# Patient Record
Sex: MALE | Race: WHITE | Employment: UNEMPLOYED | URBAN - METROPOLITAN AREA
[De-identification: names, ages, dates, MRNs, and addresses within clinical notes are randomized per-mention and may not be internally consistent; named-entity substitution may affect disease eponyms.]

---

## 2017-02-16 ENCOUNTER — HOSPITAL ENCOUNTER (OUTPATIENT)
Dept: LAB | Age: 47
Discharge: HOME | End: 2017-02-16
Payer: MEDICARE

## 2017-02-16 LAB
ALBUMIN SERPL BCP-MCNC: 3.8 G/DL (ref 3.4–5)
ALBUMIN/GLOB SERPL: 1.1 {RATIO} (ref 0.8–1.7)
ALP SERPL-CCNC: 58 U/L (ref 45–117)
ALT SERPL-CCNC: 46 U/L (ref 16–61)
ANION GAP BLD CALC-SCNC: 8 MMOL/L (ref 3–18)
AST SERPL W P-5'-P-CCNC: 32 U/L (ref 15–37)
BILIRUB SERPL-MCNC: 0.8 MG/DL (ref 0.2–1)
BUN SERPL-MCNC: 9 MG/DL (ref 7–18)
BUN/CREAT SERPL: 8 (ref 12–20)
CALCIUM SERPL-MCNC: 9.1 MG/DL (ref 8.5–10.1)
CHLORIDE SERPL-SCNC: 104 MMOL/L (ref 100–108)
CHOLEST SERPL-MCNC: 175 MG/DL
CO2 SERPL-SCNC: 29 MMOL/L (ref 21–32)
CREAT SERPL-MCNC: 1.16 MG/DL (ref 0.6–1.3)
GLOBULIN SER CALC-MCNC: 3.4 G/DL (ref 2–4)
GLUCOSE SERPL-MCNC: 110 MG/DL (ref 74–99)
HBA1C MFR BLD: 5.5 % (ref 4.2–5.6)
HDLC SERPL-MCNC: 33 MG/DL (ref 40–60)
HDLC SERPL: 5.3 {RATIO} (ref 0–5)
LDLC SERPL CALC-MCNC: 107 MG/DL (ref 0–100)
LIPID PROFILE,FLP: ABNORMAL
POTASSIUM SERPL-SCNC: 3.9 MMOL/L (ref 3.5–5.5)
PROT SERPL-MCNC: 7.2 G/DL (ref 6.4–8.2)
SODIUM SERPL-SCNC: 141 MMOL/L (ref 136–145)
TRIGL SERPL-MCNC: 175 MG/DL (ref ?–150)
VLDLC SERPL CALC-MCNC: 35 MG/DL

## 2017-02-16 PROCEDURE — 83036 HEMOGLOBIN GLYCOSYLATED A1C: CPT | Performed by: FAMILY MEDICINE

## 2017-02-16 PROCEDURE — 80053 COMPREHEN METABOLIC PANEL: CPT | Performed by: FAMILY MEDICINE

## 2017-02-16 PROCEDURE — 80061 LIPID PANEL: CPT | Performed by: FAMILY MEDICINE

## 2017-02-16 PROCEDURE — 36415 COLL VENOUS BLD VENIPUNCTURE: CPT | Performed by: FAMILY MEDICINE

## 2017-02-27 ENCOUNTER — OFFICE VISIT (OUTPATIENT)
Dept: FAMILY MEDICINE CLINIC | Age: 47
End: 2017-02-27

## 2017-02-27 VITALS
SYSTOLIC BLOOD PRESSURE: 120 MMHG | DIASTOLIC BLOOD PRESSURE: 78 MMHG | HEIGHT: 73 IN | BODY MASS INDEX: 31.81 KG/M2 | WEIGHT: 240 LBS | HEART RATE: 97 BPM | TEMPERATURE: 97.7 F | RESPIRATION RATE: 20 BRPM

## 2017-02-27 DIAGNOSIS — E78.1 HYPERTRIGLYCERIDEMIA: ICD-10-CM

## 2017-02-27 DIAGNOSIS — E78.2 MIXED HYPERLIPIDEMIA: Primary | ICD-10-CM

## 2017-02-27 DIAGNOSIS — F63.9 IMPULSE CONTROL DISORDER: ICD-10-CM

## 2017-02-27 DIAGNOSIS — F32.89 OTHER DEPRESSION: ICD-10-CM

## 2017-02-27 DIAGNOSIS — R73.01 ELEVATED FASTING BLOOD SUGAR: ICD-10-CM

## 2017-02-27 RX ORDER — TRAZODONE HYDROCHLORIDE 100 MG/1
TABLET ORAL
COMMUNITY
Start: 2017-02-10

## 2017-02-27 NOTE — PROGRESS NOTES
Chief Complaint   Patient presents with    Anxiety    Depression    Allergic Rhinitis    Results     discuss lab results       Health Maintenance reviewed     1. Have you been to the ER, urgent care clinic since your last visit? Hospitalized since your last visit? No    2. Have you seen or consulted any other health care providers outside of the [address] [place] [address] since your last visit? Include any pap smears or colon screening.  No

## 2017-02-27 NOTE — MR AVS SNAPSHOT
Visit Information Date & Time Provider Department Dept. Phone Encounter #  
 2/27/2017  2:30 PM Red Albarran, [address] [phone] Follow-up Instructions Return in about 3 months (around 5/27/2017). Upcoming Health Maintenance Date Due DTaP/Tdap/Td series (1 - Tdap) 1/10/1990 INFLUENZA AGE 9 TO ADULT 8/1/2016 Allergies as of 2/27/2017  Review Complete On: 2/27/2017 By: Red Albarran MD  
 No Known Allergies Current Immunizations  Reviewed on 6/7/2016 No immunizations on file. Not reviewed this visit You Were Diagnosed With   
  
 Codes Comments Mixed hyperlipidemia    -  Primary ICD-10-CM: P60.5 ICD-9-CM: 272.2 Hypertriglyceridemia     ICD-10-CM: E78.1 ICD-9-CM: 272.1 Elevated fasting blood sugar     ICD-10-CM: R73.01 
ICD-9-CM: 790.21 Impulse control disorder     ICD-10-CM: F63.9 ICD-9-CM: 312.30 Other depression     ICD-10-CM: F32.89 Vitals BP  
  
  
  
  
  
 142/89 (BP 1 Location: Right arm, BP Patient Position: Sitting) BMI and BSA Data Body Mass Index Body Surface Area  
 31.66 kg/m 2 2.37 m 2 Preferred Pharmacy Pharmacy Name Phone [facility] [address] [place], [address] Your Updated Medication List  
  
   
This list is accurate as of: 2/27/17  3:01 PM.  Always use your most recent med list.  
  
  
  
  
 CLARITIN 10 mg tablet Generic drug:  loratadine Take 10 mg by mouth as needed. clonazePAM 1 mg tablet Commonly known as:  KlonoPIN  
TAKE ONE-HALF TO ONE TABLET BY MOUTH TWICE DAILY AS NEEDED  
  
 gabapentin 300 mg capsule Commonly known as:  NEURONTIN  
TAKE ONE CAPSULE BY MOUTH THREE TIMES DAILY  
  
 sertraline 100 mg tablet Commonly known as:  ZOLOFT  
TAKE TWO TABLETS BY MOUTH ONCE DAILY  
  
 travoprost 0.004 % ophthalmic solution Commonly known as:  TRAVATAN Z  
 Administer 1 Drop to both eyes every evening. traZODone 100 mg tablet Commonly known as:  Edward Corona Follow-up Instructions Return in about 3 months (around 5/27/2017). Patient Instructions Please contact our office if you have any questions about your visit today. Introducing [facility] SERVICES! Jayesh Rios introduces [employer] patient portal. Now you can access parts of your medical record, email your doctor's office, and request medication refills online. 1. In your internet browser, go to [email or web address] 2. Click on the First Time User? Click Here link in the Sign In box. You will see the New Member Sign Up page. 3. Enter your [employer] Access Code exactly as it appears below. You will not need to use this code after youve completed the sign-up process. If you do not sign up before the expiration date, you must request a new code. · [employer] Access Code: [email or web address] Expires: 5/28/2017  2:25 PM 
 
4. Enter the last four digits of your Social Security Number (xxxx) and Date of Birth (mm/dd/yyyy) as indicated and click Submit. You will be taken to the next sign-up page. 5. Create a [employer] ID. This will be your [employer] login ID and cannot be changed, so think of one that is secure and easy to remember. 6. Create a [employer] password. You can change your password at any time. 7. Enter your Password Reset Question and Answer. This can be used at a later time if you forget your password. 8. Enter your e-mail address. You will receive e-mail notification when new information is available in [address] 9. Click Sign Up. You can now view and download portions of your medical record. 10. Click the Download Summary menu link to download a portable copy of your medical information. If you have questions, please visit the Frequently Asked Questions section of the [employer] website.  Remember, [employer] is NOT to be used for urgent needs. For medical emergencies, dial 911. Now available from your iPhone and Android! Please provide this summary of care documentation to your next provider. Your primary care clinician is listed as TORI HARDING. If you have any questions after today's visit, please call [phone].

## 2017-02-27 NOTE — PROGRESS NOTES
HISTORY OF PRESENT ILLNESS  Alon Moody is a 49 y.o. male. Chief Complaint   Patient presents with    Anxiety chronic problem, stable Reports compliance with meds no recent outbursts    Depression chronic problem, stable Reports compliance with meds     Allergic Rhinitis    Results     discuss lab results   follow up elev fbg prediabetes chronic problem, stable no meds on diet for this   follow up hyperlipidemia no meds Chronic problem, uncontrolled increased not as good with diet    HPI  Past Medical History:   Diagnosis Date    Anxiety     Depression     Elevated BP     Glaucoma     Hypokalemia     Hyponatraemia     Impulse control disorder     Other specific developmental learning difficulties     Seizure [facility])     age 6/ mid age 10-17.  Speech impediment      Current Outpatient Prescriptions   Medication Sig Dispense Refill    traZODone (DESYREL) 100 mg tablet       gabapentin (NEURONTIN) 300 mg capsule TAKE ONE CAPSULE BY MOUTH THREE TIMES DAILY 90 Cap 0    sertraline (ZOLOFT) 100 mg tablet TAKE TWO TABLETS BY MOUTH ONCE DAILY (Patient taking differently: Take 150 mg by mouth daily.) 60 Tab 3    travoprost (TRAVATAN) 0.004 % ophthalmic solution Administer 1 Drop to both eyes every evening.  loratadine (CLARITIN) 10 mg tablet Take 10 mg by mouth as needed.  clonazePAM (KLONOPIN) 1 mg tablet TAKE ONE-HALF TO ONE TABLET BY MOUTH TWICE DAILY AS NEEDED 60 Tab 2     No Known Allergies    ROS Respiratory: Negative for shortness of breath. Cardiovascular: Negative for chest pain. Genitourinary: Negative for frequency. Neurological: Negative for dizziness and headaches. Visit Vitals    /89 (BP 1 Location: Right arm, BP Patient Position: Sitting)    Pulse 97    Temp 97.7 °F (36.5 °C) (Oral)    Resp 20    Ht 6' 1\" (1.854 m)    Wt 240 lb (108.9 kg)    BMI 31.66 kg/m2       Physical Exam Nursing note and vitals reviewed.   Constitutional: He is oriented to person, place, and time. He appears well-developed and well-nourished. No distress. HENT:   Mouth/Throat: Oropharynx is clear and moist.   Neck: No JVD present. No thyromegaly present. Cardiovascular: Normal rate, regular rhythm and normal heart sounds. Pulmonary/Chest: Effort normal and breath sounds normal. No respiratory distress. He has no wheezes. He has no rales. Abdominal: Soft. Bowel sounds are normal. He exhibits no distension. There is no tenderness. There is no rebound. Musculoskeletal: He exhibits no edema and no tenderness. Lymphadenopathy:     He has no cervical adenopathy. Neurological: He is alert and oriented to person, place, and time. Skin: No pallor. Psychiatric: He has a normal mood and affect. His behavior is normal.    Results for orders placed or performed during the hospital encounter of 02/16/17   LIPID PANEL   Result Value Ref Range    LIPID PROFILE          Cholesterol, total 175 <200 MG/DL    Triglyceride 175 (H) <150 MG/DL    HDL Cholesterol 33 (L) 40 - 60 MG/DL    LDL, calculated 107 (H) 0 - 100 MG/DL    VLDL, calculated 35 MG/DL    CHOL/HDL Ratio 5.3 (H) 0 - 5.0     METABOLIC PANEL, COMPREHENSIVE   Result Value Ref Range    Sodium 141 136 - 145 mmol/L    Potassium 3.9 3.5 - 5.5 mmol/L    Chloride 104 100 - 108 mmol/L    CO2 29 21 - 32 mmol/L    Anion gap 8 3.0 - 18 mmol/L    Glucose 110 (H) 74 - 99 mg/dL    BUN 9 7.0 - 18 MG/DL    Creatinine 1.16 0.6 - 1.3 MG/DL    BUN/Creatinine ratio 8 (L) 12 - 20      GFR est AA >60 >60 ml/min/1.73m2    GFR est non-AA >60 >60 ml/min/1.73m2    Calcium 9.1 8.5 - 10.1 MG/DL    Bilirubin, total 0.8 0.2 - 1.0 MG/DL    ALT (SGPT) 46 16 - 61 U/L    AST (SGOT) 32 15 - 37 U/L    Alk.  phosphatase 58 45 - 117 U/L    Protein, total 7.2 6.4 - 8.2 g/dL    Albumin 3.8 3.4 - 5.0 g/dL    Globulin 3.4 2.0 - 4.0 g/dL    A-G Ratio 1.1 0.8 - 1.7     HEMOGLOBIN A1C W/O EAG   Result Value Ref Range    Hemoglobin A1c 5.5 4.2 - 5.6 %       ASSESSMENT and PLAN ICD-10-CM ICD-9-CM    1. Mixed hyperlipidemia uncontrolled increased some work on diet E78.2 272.2    2. Hypertriglyceridemia same E78.1 272.1    3. Elevated fasting blood sugar improved dm risk, monitor R73.01 790.21    4. Impulse control disorder stable continue current medications  F63.9 312.30    5. Other depression stable continue current medications  F32.89     Follow-up Disposition:  Return in about 3 months (around 5/27/2017).

## 2017-06-08 ENCOUNTER — OFFICE VISIT (OUTPATIENT)
Dept: FAMILY MEDICINE CLINIC | Age: 47
End: 2017-06-08

## 2017-06-08 VITALS
DIASTOLIC BLOOD PRESSURE: 84 MMHG | HEART RATE: 99 BPM | OXYGEN SATURATION: 97 % | HEIGHT: 73 IN | SYSTOLIC BLOOD PRESSURE: 145 MMHG | BODY MASS INDEX: 33.13 KG/M2 | WEIGHT: 250 LBS | TEMPERATURE: 99.2 F

## 2017-06-08 DIAGNOSIS — Z12.11 SCREEN FOR COLON CANCER: ICD-10-CM

## 2017-06-08 DIAGNOSIS — Z00.00 ROUTINE GENERAL MEDICAL EXAMINATION AT A HEALTH CARE FACILITY: Primary | ICD-10-CM

## 2017-06-08 DIAGNOSIS — R45.4 IRRITABILITY AND ANGER: ICD-10-CM

## 2017-06-08 DIAGNOSIS — Z13.31 SCREENING FOR DEPRESSION: ICD-10-CM

## 2017-06-08 DIAGNOSIS — F63.9 IMPULSE CONTROL DISORDER: ICD-10-CM

## 2017-06-08 DIAGNOSIS — Z12.5 SPECIAL SCREENING FOR MALIGNANT NEOPLASM OF PROSTATE: ICD-10-CM

## 2017-06-08 DIAGNOSIS — Z71.89 ACP (ADVANCE CARE PLANNING): ICD-10-CM

## 2017-06-08 DIAGNOSIS — H40.10X1 OPEN-ANGLE GLAUCOMA OF BOTH EYES, MILD STAGE, UNSPECIFIED OPEN-ANGLE GLAUCOMA TYPE: ICD-10-CM

## 2017-06-08 DIAGNOSIS — Z71.89 ADVANCED DIRECTIVES, COUNSELING/DISCUSSION: ICD-10-CM

## 2017-06-08 DIAGNOSIS — Z13.39 SCREENING FOR ALCOHOLISM: ICD-10-CM

## 2017-06-08 NOTE — PATIENT INSTRUCTIONS
Please contact our office if you have any questions about your visit today. Well Visit, Ages 25 to 48: Care Instructions  Your Care Instructions  Physical exams can help you stay healthy. Your doctor has checked your overall health and may have suggested ways to take good care of yourself. He or she also may have recommended tests. At home, you can help prevent illness with healthy eating, regular exercise, and other steps. Follow-up care is a key part of your treatment and safety. Be sure to make and go to all appointments, and call your doctor if you are having problems. It's also a good idea to know your test results and keep a list of the medicines you take. How can you care for yourself at home? · Reach and stay at a healthy weight. This will lower your risk for many problems, such as obesity, diabetes, heart disease, and high blood pressure. · Get at least 30 minutes of physical activity on most days of the week. Walking is a good choice. You also may want to do other activities, such as running, swimming, cycling, or playing tennis or team sports. Discuss any changes in your exercise program with your doctor. · Do not smoke or allow others to smoke around you. If you need help quitting, talk to your doctor about stop-smoking programs and medicines. These can increase your chances of quitting for good. · Talk to your doctor about whether you have any risk factors for sexually transmitted infections (STIs). Having one sex partner (who does not have STIs and does not have sex with anyone else) is a good way to avoid these infections. · Use birth control if you do not want to have children at this time. Talk with your doctor about the choices available and what might be best for you. · Protect your skin from too much sun. When you're outdoors from 10 a.m. to 4 p.m., stay in the shade or cover up with clothing and a hat with a wide brim. Wear sunglasses that block UV rays.  Even when it's cloudy, put broad-spectrum sunscreen (SPF 30 or higher) on any exposed skin. · See a dentist one or two times a year for checkups and to have your teeth cleaned. · Wear a seat belt in the car. · Drink alcohol in moderation, if at all. That means no more than 2 drinks a day for men and 1 drink a day for women. Follow your doctor's advice about when to have certain tests. These tests can spot problems early. For everyone  · Cholesterol. Have the fat (cholesterol) in your blood tested after age 21. Your doctor will tell you how often to have this done based on your age, family history, or other things that can increase your risk for heart disease. · Blood pressure. Have your blood pressure checked during a routine doctor visit. Your doctor will tell you how often to check your blood pressure based on your age, your blood pressure results, and other factors. · Vision. Talk with your doctor about how often to have a glaucoma test.  · Diabetes. Ask your doctor whether you should have tests for diabetes. · Colon cancer. Have a test for colon cancer at age 48. You may have one of several tests. If you are younger than 48, you may need a test earlier if you have any risk factors. Risk factors include whether you already had a precancerous polyp removed from your colon or whether your parent, brother, sister, or child has had colon cancer. For women  · Breast exam and mammogram. Talk to your doctor about when you should have a clinical breast exam and a mammogram. Medical experts differ on whether and how often women under 50 should have these tests. Your doctor can help you decide what is right for you. · Pap test and pelvic exam. Begin Pap tests at age 24. A Pap test is the best way to find cervical cancer. The test often is part of a pelvic exam. Ask how often to have this test.  · Tests for sexually transmitted infections (STIs). Ask whether you should have tests for STIs.  You may be at risk if you have sex with more than one person, especially if your partners do not wear condoms. For men  · Tests for sexually transmitted infections (STIs). Ask whether you should have tests for STIs. You may be at risk if you have sex with more than one person, especially if you do not wear a condom. · Testicular cancer exam. Ask your doctor whether you should check your testicles regularly. · Prostate exam. Talk to your doctor about whether you should have a blood test (called a PSA test) for prostate cancer. Experts differ on whether and when men should have this test. Some experts suggest it if you are older than 39 and are -American or have a father or brother who got prostate cancer when he was younger than 72. When should you call for help? Watch closely for changes in your health, and be sure to contact your doctor if you have any problems or symptoms that concern you. Where can you learn more? Go to http://kittyHDFfavian.info/. Enter P072 in the search box to learn more about \"Well Visit, Ages 25 to 48: Care Instructions. \"  Current as of: July 19, 2016  Content Version: 11.2  © 9779-5587 [employer]. Care instructions adapted under license by [employer] (which disclaims liability or warranty for this information). If you have questions about a medical condition or this instruction, always ask your healthcare professional. [place] [address] any warranty or liability for your use of this information. Medicare Part B Preventive Services Limitations Recommendation Scheduled   Bone Mass Measurement  (age 72 & older, biennial) Requires diagnosis related to osteoporosis or estrogen deficiency.  Biennial benefit unless patient has history of long-term glucocorticoid tx or baseline is needed because initial test was by other method     Cardiovascular Screening Blood Tests (every 5 years)  Total cholesterol, HDL, Triglycerides Order as a panel if possible     Colorectal Cancer Screening  -Fecal occult blood test (annual)  -Flexible sigmoidoscopy (5y)  -Screening colonoscopy (10y)  -Barium Enema      Counseling to Prevent Tobacco Use (up to 8 sessions per year)  - Counseling greater than 3 and up to 10 minutes  - Counseling greater than 10 minutes Patients must be asymptomatic of tobacco-related conditions to receive as preventive service     Diabetes Screening Tests (at least every 3 years, Medicare covers annually or at 6-month intervals for prediabetic patients)    Fasting blood sugar (FBS) or glucose tolerance test (GTT) Patient must be diagnosed with one of the following:  -Hypertension, Dyslipidemia, obesity, previous impaired FBS or GTT  Or any two of the following: overweight, FH of diabetes, age ? 72, history of gestational diabetes, birth of baby weighing more than 9 pounds     Diabetes Self-Management Training (DSMT) (no USPSTF recommendation) Requires referral by treating physician for patient with diabetes or renal disease. 10 hours of initial DSMT session of no less than 30 minutes each in a continuous 12-month period. 2 hours of follow-up DSMT in subsequent years. Glaucoma Screening (no USPSTF recommendation) Diabetes mellitus, family history, , age 48 or over,  American, age 72 or over     Human Immunodeficiency Virus (HIV) Screening (annually for increased risk patients)  HIV-1 and HIV-2 by EIA, TODD, rapid antibody test, or oral mucosa transudate Patient must be at increased risk for HIV infection per USPSTF guidelines or pregnant. Tests covered annually for patients at increased risk. Pregnant patients may receive up to 3 test during pregnancy. Medical Nutrition Therapy (MNT) (for diabetes or renal disease not recommended schedule) Requires referral by treating physician for patient with diabetes or renal disease.   Can be provided in same year as diabetes self-management training (DSMT), and CMS recommends medical nutrition therapy take place after DSMT. Up to 3 hours for initial year and 2 hours in subsequent years. Prostate Cancer Screening (annually up to age 76)  - Digital rectal exam (SONALI)  - Prostate specific antigen (PSA) Annually (age 48 or over), SONALI not paid separately when covered E/M service is provided on same date  Men up to age 76 may need a screening blood test for prostate cancer at certain intervals, depending on their personal and family history. This decision is between the patient and his provider. Seasonal Influenza Vaccination (annually)        Pneumococcal Vaccination (once after 72)      Hepatitis B Vaccinations (if medium/high risk) Medium/high risk factors:  End-stage renal disease,  Hemophiliacs who received Factor VIII or IX concentrates, Clients of institutions for the mentally retarded, Persons who live in the same house as a HepB virus carrier, Homosexual men, Illicit injectable drug abusers. Shingles Vaccination A shingles vaccine is also recommended once in a lifetime after age 61     Ultrasound Screening for Abdominal Aortic Aneurysm (AAA) (once) Patient must be referred through [facility] and not have had a screening for abdominal aortic aneurysm before under Medicare.   Limited to patients who meet one of the following criteria:  - Men who are 73-68 years old and have smoked more than 100 cigarettes in their lifetime.  -Anyone with a FH of AAA  -Anyone recommended for screening by USPSTF

## 2017-06-08 NOTE — ACP (ADVANCE CARE PLANNING)
Advance Care Planning      Advance Care Planning    Advance Care Planning (ACP) Provider Note - Comprehensive     Date of ACP Conversation: 06/08/17  Persons included in Conversation:  patient  Length of ACP Conversation in minutes:  16 minutes    Authorized Decision Maker (if patient is incapable of making informed decisions): This person is:  none          General ACP for ALL Patients with Decision Making Capacity:   Importance of advance care planning, including choosing a healthcare agent to communicate patient's healthcare decisions if patient lost the ability to make decisions, such as after a sudden illness or accident  Understanding of the healthcare agent role was assessed and information provided  Exploration of values, goals, and preferences if recovery is not expected, even with continued medical treatment in the event of: Imminent death  Severe, permanent brain injury  Opportunity offered to explore how cultural, Samaritan, spiritual, or personal beliefs would affect decisions for future care     Review of Existing Advance Directive:  none    For Serious or Chronic Illness:  No known illness    Interventions Provided:  Recommended completion of Advance Directive form after review of ACP materials and conversation with prospective healthcare agent   Recommended communicating the plan and making copies for the healthcare agent, personal physician, and others as appropriate (e.g., health system)  Recommended review of completed ACP document annually or upon change in health status Discussed in detail [facility] with patient.  Patient seems to have decided on no heroic measures if terminally ill, but wants care if deemed appropriate, at the present time and handouts given for pt complete disposition

## 2017-06-08 NOTE — PROGRESS NOTES
Chief Complaint   Patient presents with   [address] [place] [address] Annual Wellness Visit     1. Have you been to the ER, urgent care clinic since your last visit? Hospitalized since your last visit? No    2. Have you seen or consulted any other health care providers outside of the [address] [place] [address] since your last visit? Include any pap smears or colon screening. No    This is a Subsequent Medicare Annual Wellness Visit providing Personalized Prevention Plan Services (PPPS) (Performed 12 months after initial AWV and PPPS )    I have reviewed the patient's medical history in detail and updated the computerized patient record. History     Past Medical History:   Diagnosis Date    Anxiety     Depression     Elevated BP     Glaucoma     Hypokalemia     Hyponatraemia     Impulse control disorder     Other specific developmental learning difficulties     Seizure [facility])     age 6/ mid age 10-17.  Speech impediment       No past surgical history on file. Current Outpatient Prescriptions   Medication Sig Dispense Refill    traZODone (DESYREL) 100 mg tablet       gabapentin (NEURONTIN) 300 mg capsule TAKE ONE CAPSULE BY MOUTH THREE TIMES DAILY 90 Cap 0    clonazePAM (KLONOPIN) 1 mg tablet TAKE ONE-HALF TO ONE TABLET BY MOUTH TWICE DAILY AS NEEDED 60 Tab 2    sertraline (ZOLOFT) 100 mg tablet TAKE TWO TABLETS BY MOUTH ONCE DAILY (Patient taking differently: Take 150 mg by mouth daily.) 60 Tab 3    travoprost (TRAVATAN) 0.004 % ophthalmic solution Administer 1 Drop to both eyes every evening.  loratadine (CLARITIN) 10 mg tablet Take 10 mg by mouth as needed. No Known Allergies  No family history on file.   Social History   Substance Use Topics    Smoking status: Never Smoker    Smokeless tobacco: Never Used    Alcohol use No     Patient Active Problem List   Diagnosis Code    Impulse control disorder F63.9    Depression F32.9    Anxiety F41.9    Insomnia G47.00    Irritability and anger R45.4    AR (allergic rhinitis) J30.9    Glaucoma, open angle, Dr. Stefani Camilo H40.10X0    Cataract H26.9    Atypical chest pain R07.89    Prediabetes R73.03    Hypertriglyceridemia E78.1    ACP (advance care planning) Z71.89       Depression Risk Factor Screening:     PHQ over the last two weeks 6/7/2016   PHQ Not Done Active Diagnosis of Depression or Bipolar Disorder   Little interest or pleasure in doing things Not at all   Feeling down, depressed or hopeless Not at all   Total Score PHQ 2 0     Alcohol Risk Factor Screening: On any occasion during the past 3 months, have you had more than 4 drinks containing alcohol? No    Do you average more than 14 drinks per week? No      Functional Ability and Level of Safety:   No exam data present    Hearing Loss   none    Activities of Daily Living   Self-care. Requires assistance with: no ADLs    Fall Risk   No flowsheet data found. Abuse Screen   Patient is not abused    Review of Systems   Pertinent items are noted in HPI. Physical Examination     Evaluation of Cognitive Function:  Mood/affect:  neutral  Appearance: age appropriate and within normal Limits  Family member/caregiver input: father states pt doing well    Nursing note and vitals reviewed. Constitutional: He is oriented to person, place, and time. He appears well-developed and well-nourished. No distress. HENT:   Mouth/Throat: Oropharynx is clear and moist.   Neck: No JVD present. No thyromegaly present. Cardiovascular: Normal rate, regular rhythm and normal heart sounds. Pulmonary/Chest: Effort normal and breath sounds normal. No respiratory distress. He has no wheezes. He has no rales. Abdominal: Soft. Bowel sounds are normal. He exhibits no distension. There is no tenderness. There is no rebound. Musculoskeletal: He exhibits no edema and no tenderness. Lymphadenopathy:     He has no cervical adenopathy. Neurological: He is alert and oriented to person, place, and time.    Skin: No pallor. Psychiatric: He has a normal mood and affect. His behavior is normal.       Patient Care Team:  Talita Cortez MD as PCP - General    Advice/Referrals/Counseling   Education and counseling provided:  Are appropriate based on today's review and evaluation  End-of-Life planning (with patient's consent)  Pneumococcal Vaccine  Influenza Vaccine  Prostate cancer screening tests (PSA, covered annually)  Colorectal cancer screening tests      Assessment/Plan       ICD-10-CM ICD-9-CM    1. Routine general medical examination at a health care facility Z00.00 V70.0    2. Screening for alcoholism Z13.89 V79.1    3. Advanced directives, counseling/discussion Z71.89 V65.49    4. Screening for depression Z13.89 V79.0 DEPRESSION SCREEN ANNUAL   5. Screen for colon cancer Z12.11 V76.51 OCCULT BLOOD, IMMUNOASSAY (FIT)   6. Special screening for malignant neoplasm of prostate Z12.5 V76.44 PSA SCREENING (SCREENING)   7.  ACP (advance care planning) Z71.89 V65.49 ADVANCE CARE PLANNING FIRST 30 MINS

## 2017-06-08 NOTE — MR AVS SNAPSHOT
Visit Information Date & Time Provider Department Dept. Phone Encounter #  
 6/8/2017  2:30 PM Germain Khalil 69 [phone] [id number] Your Appointments 6/12/2017  2:30 PM  
Follow Up with MD Germain Khalil 69 (--) Appt Note: 3 month fu; [address] [id number] [phone]  
  
   
 Jen [address]ann[address] [place] [id number] Upcoming Health Maintenance Date Due DTaP/Tdap/Td series (1 - Tdap) 1/10/1990 INFLUENZA AGE 9 TO ADULT 8/1/2017 Allergies as of 6/8/2017  Review Complete On: 6/8/2017 By: King Olga LPN No Known Allergies Current Immunizations  Reviewed on 6/8/2017 No immunizations on file. Reviewed by Estrella Del Rosario MD on 6/8/2017 at  3:08 PM  
You Were Diagnosed With   
  
 Codes Comments Routine general medical examination at a health care facility    -  Primary ICD-10-CM: Z00.00 ICD-9-CM: V70.0 Screening for alcoholism     ICD-10-CM: Z13.89 ICD-9-CM: V79.1 Advanced directives, counseling/discussion     ICD-10-CM: Z71.89 ICD-9-CM: V65.49 Screening for depression     ICD-10-CM: Z13.89 ICD-9-CM: V79.0 Screen for colon cancer     ICD-10-CM: Z12.11 ICD-9-CM: V76.51 Special screening for malignant neoplasm of prostate     ICD-10-CM: Z12.5 ICD-9-CM: V76.44   
 ACP (advance care planning)     ICD-10-CM: Z71.89 ICD-9-CM: V65.49 Impulse control disorder     ICD-10-CM: F63.9 ICD-9-CM: 312.30 Irritability and anger     ICD-10-CM: R45.4 ICD-9-CM: 799.22 Open-angle glaucoma of both eyes, mild stage, unspecified open-angle glaucoma type     ICD-10-CM: H40.10X1 ICD-9-CM: 365.10, 365.71 Vitals BP Pulse Temp Height(growth percentile) Weight(growth percentile) SpO2  
 145/84 99 99.2 °F (37.3 °C) (Oral) 6' 1\" (1.854 m) 250 lb (113.4 kg) 97% BMI Smoking Status 32.98 kg/m2 Never Smoker BMI and BSA Data Body Mass Index Body Surface Area 32.98 kg/m 2 2.42 m 2 Preferred Pharmacy Pharmacy Name Phone [facility] [address] [place], [address] Your Updated Medication List  
  
   
This list is accurate as of: 6/8/17  3:17 PM.  Always use your most recent med list.  
  
  
  
  
 CLARITIN 10 mg tablet Generic drug:  loratadine Take 10 mg by mouth as needed. clonazePAM 1 mg tablet Commonly known as:  KlonoPIN  
TAKE ONE-HALF TO ONE TABLET BY MOUTH TWICE DAILY AS NEEDED  
  
 gabapentin 300 mg capsule Commonly known as:  NEURONTIN  
TAKE ONE CAPSULE BY MOUTH THREE TIMES DAILY  
  
 sertraline 100 mg tablet Commonly known as:  ZOLOFT  
TAKE TWO TABLETS BY MOUTH ONCE DAILY  
  
 travoprost 0.004 % ophthalmic solution Commonly known as:  TRAVATAN Z Administer 1 Drop to both eyes every evening. traZODone 100 mg tablet Commonly known as:  Sherice Lugo We Performed the Following ADVANCE CARE PLANNING FIRST 30 MINS [42522 CPT(R)] [facility] [[id number] [facility]] To-Do List   
 06/08/2017 Lab:  OCCULT BLOOD, IMMUNOASSAY (FIT)   
  
 06/08/2017 Lab:  PSA SCREENING (SCREENING) Patient Instructions Please contact our office if you have any questions about your visit today. Well Visit, Ages 25 to 48: Care Instructions Your Care Instructions Physical exams can help you stay healthy. Your doctor has checked your overall health and may have suggested ways to take good care of yourself. He or she also may have recommended tests. At home, you can help prevent illness with healthy eating, regular exercise, and other steps. Follow-up care is a key part of your treatment and safety. Be sure to make and go to all appointments, and call your doctor if you are having problems. It's also a good idea to know your test results and keep a list of the medicines you take. How can you care for yourself at home? · Reach and stay at a healthy weight. This will lower your risk for many problems, such as obesity, diabetes, heart disease, and high blood pressure. · Get at least 30 minutes of physical activity on most days of the week. Walking is a good choice. You also may want to do other activities, such as running, swimming, cycling, or playing tennis or team sports. Discuss any changes in your exercise program with your doctor. · Do not smoke or allow others to smoke around you. If you need help quitting, talk to your doctor about stop-smoking programs and medicines. These can increase your chances of quitting for good. · Talk to your doctor about whether you have any risk factors for sexually transmitted infections (STIs). Having one sex partner (who does not have STIs and does not have sex with anyone else) is a good way to avoid these infections. · Use birth control if you do not want to have children at this time. Talk with your doctor about the choices available and what might be best for you. · Protect your skin from too much sun. When you're outdoors from 10 a.m. to 4 p.m., stay in the shade or cover up with clothing and a hat with a wide brim. Wear sunglasses that block UV rays. Even when it's cloudy, put broad-spectrum sunscreen (SPF 30 or higher) on any exposed skin. · See a dentist one or two times a year for checkups and to have your teeth cleaned. · Wear a seat belt in the car. · Drink alcohol in moderation, if at all. That means no more than 2 drinks a day for men and 1 drink a day for women. Follow your doctor's advice about when to have certain tests. These tests can spot problems early. For everyone · Cholesterol. Have the fat (cholesterol) in your blood tested after age 21. Your doctor will tell you how often to have this done based on your age, family history, or other things that can increase your risk for heart disease. · Blood pressure. Have your blood pressure checked during a routine doctor visit. Your doctor will tell you how often to check your blood pressure based on your age, your blood pressure results, and other factors. · Vision. Talk with your doctor about how often to have a glaucoma test. 
· Diabetes. Ask your doctor whether you should have tests for diabetes. · Colon cancer. Have a test for colon cancer at age 48. You may have one of several tests. If you are younger than 48, you may need a test earlier if you have any risk factors. Risk factors include whether you already had a precancerous polyp removed from your colon or whether your parent, brother, sister, or child has had colon cancer. For women · Breast exam and mammogram. Talk to your doctor about when you should have a clinical breast exam and a mammogram. Medical experts differ on whether and how often women under 50 should have these tests. Your doctor can help you decide what is right for you. · Pap test and pelvic exam. Begin Pap tests at age 24. A Pap test is the best way to find cervical cancer. The test often is part of a pelvic exam. Ask how often to have this test. 
· Tests for sexually transmitted infections (STIs). Ask whether you should have tests for STIs. You may be at risk if you have sex with more than one person, especially if your partners do not wear condoms. For men · Tests for sexually transmitted infections (STIs). Ask whether you should have tests for STIs. You may be at risk if you have sex with more than one person, especially if you do not wear a condom. · Testicular cancer exam. Ask your doctor whether you should check your testicles regularly. · Prostate exam. Talk to your doctor about whether you should have a blood test (called a PSA test) for prostate cancer.  Experts differ on whether and when men should have this test. Some experts suggest it if you are older than 39 and are -American or have a father or brother who got prostate cancer when he was younger than 72. When should you call for help? Watch closely for changes in your health, and be sure to contact your doctor if you have any problems or symptoms that concern you. Where can you learn more? Go to http://kitty-favian.info/. Enter P072 in the search box to learn more about \"Well Visit, Ages 25 to 48: Care Instructions. \" Current as of: July 19, 2016 Content Version: 11.2 © 4406-9125 [employer]. Care instructions adapted under license by [employer] (which disclaims liability or warranty for this information). If you have questions about a medical condition or this instruction, always ask your healthcare professional. [address] any warranty or liability for your use of this information. Medicare Part B Preventive Services Limitations Recommendation Scheduled Bone Mass Measurement 
(age 72 & older, biennial) Requires diagnosis related to osteoporosis or estrogen deficiency. Biennial benefit unless patient has history of long-term glucocorticoid tx or baseline is needed because initial test was by other method Cardiovascular Screening Blood Tests (every 5 years) Total cholesterol, HDL, Triglycerides Order as a panel if possible Colorectal Cancer Screening 
-Fecal occult blood test (annual) -Flexible sigmoidoscopy (5y) 
-Screening colonoscopy (10y) -Barium Enema Counseling to Prevent Tobacco Use (up to 8 sessions per year) - Counseling greater than 3 and up to 10 minutes - Counseling greater than 10 minutes Patients must be asymptomatic of tobacco-related conditions to receive as preventive service Diabetes Screening Tests (at least every 3 years, Medicare covers annually or at 6-month intervals for prediabetic patients) Fasting blood sugar (FBS) or glucose tolerance test (GTT) Patient must be diagnosed with one of the following: 
-Hypertension, Dyslipidemia, obesity, previous impaired FBS or GTT 
Or any two of the following: overweight, FH of diabetes, age ? 72, history of gestational diabetes, birth of baby weighing more than 9 pounds Diabetes Self-Management Training (DSMT) (no USPSTF recommendation) Requires referral by treating physician for patient with diabetes or renal disease. 10 hours of initial DSMT session of no less than 30 minutes each in a continuous 12-month period. 2 hours of follow-up DSMT in subsequent years. Glaucoma Screening (no USPSTF recommendation) Diabetes mellitus, family history, , age 48 or over,  American, age 72 or over Human Immunodeficiency Virus (HIV) Screening (annually for increased risk patients) HIV-1 and HIV-2 by EIA, TODD, rapid antibody test, or oral mucosa transudate Patient must be at increased risk for HIV infection per USPSTF guidelines or pregnant. Tests covered annually for patients at increased risk. Pregnant patients may receive up to 3 test during pregnancy. Medical Nutrition Therapy (MNT) (for diabetes or renal disease not recommended schedule) Requires referral by treating physician for patient with diabetes or renal disease. Can be provided in same year as diabetes self-management training (DSMT), and CMS recommends medical nutrition therapy take place after DSMT. Up to 3 hours for initial year and 2 hours in subsequent years. Prostate Cancer Screening (annually up to age 76) - Digital rectal exam (SONALI) - Prostate specific antigen (PSA) Annually (age 48 or over), SONALI not paid separately when covered E/M service is provided on same date Men up to age 76 may need a screening blood test for prostate cancer at certain intervals, depending on their personal and family history. This decision is between the patient and his provider. Seasonal Influenza Vaccination (annually) Pneumococcal Vaccination (once after 65) Hepatitis B Vaccinations (if medium/high risk) Medium/high risk factors:  End-stage renal disease, Hemophiliacs who received Factor VIII or IX concentrates, Clients of institutions for the mentally retarded, Persons who live in the same house as a HepB virus carrier, Homosexual men, Illicit injectable drug abusers. Shingles Vaccination A shingles vaccine is also recommended once in a lifetime after age 61 Ultrasound Screening for Abdominal Aortic Aneurysm (AAA) (once) Patient must be referred through IPPE and not have had a screening for abdominal aortic aneurysm before under Medicare. Limited to patients who meet one of the following criteria: 
- Men who are 73-68 years old and have smoked more than 100 cigarettes in their lifetime. 
-Anyone with a FH of AAA 
-Anyone recommended for screening by [facility] Introducing [facility]! Isaac Everett introduces [employer] patient portal. Now you can access parts of your medical record, email your doctor's office, and request medication refills online. 1. In your internet browser, go to [email or web address] 2. Click on the First Time User? Click Here link in the Sign In box. You will see the New Member Sign Up page. 3. Enter your [employer] Access Code exactly as it appears below. You will not need to use this code after youve completed the sign-up process. If you do not sign up before the expiration date, you must request a new code. · [employer] Access Code: [email or web address] Expires: 9/6/2017  2:34 PM 
 
4. Enter the last four digits of your Social Security Number (xxxx) and Date of Birth (mm/dd/yyyy) as indicated and click Submit. You will be taken to the next sign-up page. 5. Create a Skyeng ID. This will be your Sky[employer] login ID and cannot be changed, so think of one that is secure and easy to remember. 6. Create a Hot Mix Mobilet password. You can change your password at any time. 7. Enter your Password Reset Question and Answer. This can be used at a later time if you forget your password. 8. Enter your e-mail address. You will receive e-mail notification when new information is available in [address] 9. Click Sign Up. You can now view and download portions of your medical record. 10. Click the Download Summary menu link to download a portable copy of your medical information. If you have questions, please visit the Frequently Asked Questions section of the [employer] website. Remember, [employer] is NOT to be used for urgent needs. For medical emergencies, dial 911. Now available from your iPhone and Android! Please provide this summary of care documentation to your next provider. Your primary care clinician is listed as TORI HARDING. If you have any questions after today's visit, please call [phone].

## 2017-06-08 NOTE — PROGRESS NOTES
HISTORY OF PRESENT ILLNESS  Fabian Goins is a 49 y.o. male. follow-up depression irritability and anger chronic problem stable at present time. Patient's father states that he is doing fairly well on his medications. He reports compliance with medications. Klonopin is helpful he did have his eye exam found to have glaucoma  This is a recurrent problem no new symptoms  HPI  Past Medical History:   Diagnosis Date    Anxiety     Depression     Elevated BP     Glaucoma     Hypokalemia     Hyponatraemia     Impulse control disorder     Other specific developmental learning difficulties     Seizure [facility])     age 6/ mid age 10-17.  Speech impediment      Current Outpatient Prescriptions   Medication Sig Dispense Refill    traZODone (DESYREL) 100 mg tablet       gabapentin (NEURONTIN) 300 mg capsule TAKE ONE CAPSULE BY MOUTH THREE TIMES DAILY 90 Cap 0    clonazePAM (KLONOPIN) 1 mg tablet TAKE ONE-HALF TO ONE TABLET BY MOUTH TWICE DAILY AS NEEDED 60 Tab 2    sertraline (ZOLOFT) 100 mg tablet TAKE TWO TABLETS BY MOUTH ONCE DAILY (Patient taking differently: Take 150 mg by mouth daily.) 60 Tab 3    travoprost (TRAVATAN) 0.004 % ophthalmic solution Administer 1 Drop to both eyes every evening.  loratadine (CLARITIN) 10 mg tablet Take 10 mg by mouth as needed. No Known Allergies    Review of Systems   Psychiatric/Behavioral: Positive for depression. Negative for suicidal ideas. The patient is nervous/anxious and has insomnia. Visit Vitals    /84    Pulse 99    Temp 99.2 °F (37.3 °C) (Oral)    Ht 6' 1\" (1.854 m)    Wt 250 lb (113.4 kg)    SpO2 97%    BMI 32.98 kg/m2       Physical Exam  Nursing note and vitals reviewed. Constitutional: He is oriented to person, place, and time. He appears well-developed and well-nourished. No distress. HENT:   Mouth/Throat: Oropharynx is clear and moist.   Neck: No JVD present. No thyromegaly present.    Cardiovascular: Normal rate, regular rhythm and normal heart sounds. Pulmonary/Chest: Effort normal and breath sounds normal. No respiratory distress. He has no wheezes. He has no rales. Abdominal: Soft. Bowel sounds are normal. He exhibits no distension. There is no tenderness. There is no rebound. Musculoskeletal: He exhibits no edema and no tenderness. Lymphadenopathy:     He has no cervical adenopathy. Neurological: He is alert and oriented to person, place, and time. Skin: No pallor. Psychiatric: He has a normal mood and affect. His behavior is normal.     ASSESSMENT and PLAN    ICD-10-CM ICD-9-CM                                                     8. Impulse control disorder stable continue current care   F63.9 312.30    9. Irritability and anger stable continue current care   R45.4 799.22    10.  Open-angle glaucoma of both eyes, mild stage, unspecified open-angle glaucoma type stable continue current care and follow-up with ophthalmology   H40.10X1 365.10      365.71

## 2017-06-09 LAB — PSA SERPL-MCNC: 0.7 NG/ML (ref 0–4)

## 2017-07-11 LAB
HEMOCCULT STL QL IA: NORMAL
PSA SERPL-MCNC: NORMAL NG/ML

## 2017-10-19 ENCOUNTER — OFFICE VISIT (OUTPATIENT)
Dept: FAMILY MEDICINE CLINIC | Age: 47
End: 2017-10-19

## 2017-10-19 VITALS
SYSTOLIC BLOOD PRESSURE: 135 MMHG | HEART RATE: 92 BPM | TEMPERATURE: 98.6 F | RESPIRATION RATE: 20 BRPM | WEIGHT: 252 LBS | DIASTOLIC BLOOD PRESSURE: 85 MMHG | BODY MASS INDEX: 33.4 KG/M2 | HEIGHT: 73 IN

## 2017-10-19 DIAGNOSIS — R45.4 IRRITABILITY AND ANGER: ICD-10-CM

## 2017-10-19 DIAGNOSIS — E78.1 HYPERTRIGLYCERIDEMIA: ICD-10-CM

## 2017-10-19 DIAGNOSIS — F41.9 ANXIETY: ICD-10-CM

## 2017-10-19 DIAGNOSIS — F63.9 IMPULSE CONTROL DISORDER: Primary | ICD-10-CM

## 2017-10-19 DIAGNOSIS — Z51.81 MEDICATION MONITORING ENCOUNTER: ICD-10-CM

## 2017-10-19 DIAGNOSIS — F32.89 OTHER DEPRESSION: ICD-10-CM

## 2017-10-19 DIAGNOSIS — R73.9 HYPERGLYCEMIA: ICD-10-CM

## 2017-10-19 NOTE — LETTER
10/19/2017 11:43 AM 
 
Mr. Leyda Haas [address] [address] Please see below for attached physical report for Mr. Jsoeph Frances, DOS 6/2017: This is a Subsequent Medicare Annual Wellness Visit providing Personalized Prevention Plan Services (PPPS) (Performed 12 months after initial AWV and PPPS ) 
  
I have reviewed the patient's medical history in detail and updated the computerized patient record.  
  
History  
  
    
Past Medical History:  
Diagnosis Date  Anxiety    
 Depression    
 Elevated BP    
 Glaucoma    
 Hypokalemia    
 Hyponatraemia    
 Impulse control disorder    
 Other specific developmental learning difficulties    
 Seizure [facility])    
  age 6/ mid age 10-17.  Speech impediment    
                       
No past surgical history on file. Current Outpatient Prescriptions Medication Sig Dispense Refill  traZODone (DESYREL) 100 mg tablet        
 gabapentin (NEURONTIN) 300 mg capsule TAKE ONE CAPSULE BY MOUTH THREE TIMES DAILY 90 Cap 0  clonazePAM (KLONOPIN) 1 mg tablet TAKE ONE-HALF TO ONE TABLET BY MOUTH TWICE DAILY AS NEEDED 60 Tab 2  
 sertraline (ZOLOFT) 100 mg tablet TAKE TWO TABLETS BY MOUTH ONCE DAILY (Patient taking differently: Take 150 mg by mouth daily.) 60 Tab 3  
 travoprost (TRAVATAN) 0.004 % ophthalmic solution Administer 1 Drop to both eyes every evening.      
 loratadine (CLARITIN) 10 mg tablet Take 10 mg by mouth as needed.      
  
No Known Allergies No family history on file. Social History Substance Use Topics  Smoking status: Never Smoker  Smokeless tobacco: Never Used  Alcohol use No  
  
    
Patient Active Problem List  
Diagnosis Code  Impulse control disorder F63.9  Depression F32.9  Anxiety F41.9  Insomnia G47.00  
 Irritability and anger R45.4  AR (allergic rhinitis) J30.9  Glaucoma, open angle, Dr. Lyle Husbands H40.10X0  Cataract H26.9  Atypical chest pain R07.89  
 Prediabetes R73.03  
 Hypertriglyceridemia E78.1  ACP (advance care planning) Z71.89  
  
  
Depression Risk Factor Screening:  
  
PHQ over the last two weeks 6/7/2016 PHQ Not Done Active Diagnosis of Depression or Bipolar Disorder Little interest or pleasure in doing things Not at all Feeling down, depressed or hopeless Not at all Total Score PHQ 2 0  
  
Alcohol Risk Factor Screening: On any occasion during the past 3 months, have you had more than 4 drinks containing alcohol? No 
  
Do you average more than 14 drinks per week? No 
  
  
Functional Ability and Level of Safety: No exam data present 
  
Hearing Loss  
none 
  
Activities of Daily Living Self-care. Requires assistance with: no ADLs 
  
Fall Risk No flowsheet data found. Abuse Screen Patient is not abused 
  
Review of Systems Pertinent items are noted in HPI. 
  
Physical Examination  
  
Evaluation of Cognitive Function: 
Mood/affect:  neutral 
Appearance: age appropriate and within normal Limits Family member/caregiver input: father states pt doing well 
  
Nursing note and vitals reviewed. Constitutional: He is oriented to person, place, and time. He appears well-developed and well-nourished. No distress. HENT:  
Mouth/Throat: Oropharynx is clear and moist.  
Neck: No JVD present. No thyromegaly present. Cardiovascular: Normal rate, regular rhythm and normal heart sounds. Pulmonary/Chest: Effort normal and breath sounds normal. No respiratory distress. He has no wheezes. He has no rales. Abdominal: Soft. Bowel sounds are normal. He exhibits no distension. There is no tenderness. There is no rebound. Musculoskeletal: He exhibits no edema and no tenderness. Lymphadenopathy:  
  He has no cervical adenopathy. Neurological: He is alert and oriented to person, place, and time. Skin: No pallor. Psychiatric: He has a normal mood and affect.  His behavior is normal.  
  : NMEG, testicles descended bilaterally, no hernia 
  
Patient Care Team: 
Royce Camacho MD as PCP - General 
  
Advice/Referrals/Counseling Education and counseling provided: 
Are appropriate based on today's review and evaluation End-of-Life planning (with patient's consent) Pneumococcal Vaccine Influenza Vaccine Prostate cancer screening tests (PSA, covered annually) Colorectal cancer screening tests 
  
  
Assessment/Plan  
  
    ICD-10-CM ICD-9-CM    
1. Routine general medical examination at a health care facility Z00.00 V70.0    
2. Screening for alcoholism Z13.89 V79.1    
3. Advanced directives, counseling/discussion Z71.89 V65.49    
4. Screening for depression Z13.89 V79.0 [place] [address] 5. Screen for colon cancer Z12.11 V76.51 OCCULT BLOOD, IMMUNOASSAY (FIT) 6. Special screening for malignant neoplasm of prostate Z12.5 V76.44 PSA SCREENING (SCREENING) 7.  ACP (advance care planning) Z71.89 V65.49 ADVANCE CARE PLANNING FIRST 27 MINS  
  
 
 
 
 
Sincerely, 
 
 
Royce Camacho MD

## 2017-10-19 NOTE — MR AVS SNAPSHOT
Visit Information Date & Time Provider Department Dept. Phone Encounter #  
 10/19/2017 10:30 AM Claudio Calixto MD [address] [id number] Follow-up Instructions Return in about 3 months (around 1/19/2018). Upcoming Health Maintenance Date Due DTaP/Tdap/Td series (1 - Tdap) 1/10/1990 INFLUENZA AGE 9 TO ADULT 8/1/2017 Allergies as of 10/19/2017  Review Complete On: 10/19/2017 By: Claudio Calixto MD  
 No Known Allergies Current Immunizations  Reviewed on 6/8/2017 No immunizations on file. Not reviewed this visit You Were Diagnosed With   
  
 Codes Comments Impulse control disorder    -  Primary ICD-10-CM: F63.9 ICD-9-CM: 312.30 Irritability and anger     ICD-10-CM: R45.4 ICD-9-CM: 799.22 Anxiety     ICD-10-CM: F41.9 ICD-9-CM: 300.00 Other depression     ICD-10-CM: F32.89 ICD-9-CM: 550 Hypertriglyceridemia     ICD-10-CM: E78.1 ICD-9-CM: 272.1 Hyperglycemia     ICD-10-CM: R73.9 ICD-9-CM: 790.29 Medication monitoring encounter     ICD-10-CM: Z51.81 
ICD-9-CM: V58.83 Vitals BP Pulse Temp Resp Height(growth percentile) Weight(growth percentile) 135/85 (BP 1 Location: Right arm, BP Patient Position: Sitting) 92 98.6 °F (37 °C) (Oral) 20 6' 1\" (1.854 m) 252 lb (114.3 kg) BMI Smoking Status 33.25 kg/m2 Never Smoker BMI and BSA Data Body Mass Index Body Surface Area  
 33.25 kg/m 2 2.43 m 2 Preferred Pharmacy Pharmacy Name Phone [facility] [address] [place] [address] 32 Your Updated Medication List  
  
   
This list is accurate as of: 10/19/17 11:48 AM.  Always use your most recent med list.  
  
  
  
  
 CLARITIN 10 mg tablet Generic drug:  loratadine Take 10 mg by mouth as needed. clonazePAM 1 mg tablet Commonly known as:  Chau Saldana  
 TAKE ONE-HALF TO ONE TABLET BY MOUTH TWICE DAILY AS NEEDED  
  
 gabapentin 300 mg capsule Commonly known as:  NEURONTIN  
TAKE ONE CAPSULE BY MOUTH THREE TIMES DAILY  
  
 sertraline 100 mg tablet Commonly known as:  ZOLOFT  
TAKE TWO TABLETS BY MOUTH ONCE DAILY  
  
 travoprost 0.004 % ophthalmic solution Commonly known as:  TRAVATAN Z Administer 1 Drop to both eyes every evening. traZODone 100 mg tablet Commonly known as:  Rikki Hernandez Follow-up Instructions Return in about 3 months (around 1/19/2018). To-Do List   
 01/19/2018 Lab:  CBC WITH AUTOMATED DIFF   
  
 01/19/2018 Lab:  HEMOGLOBIN A1C W/O EAG   
  
 01/19/2018 Lab:  LIPID PANEL   
  
 01/19/2018 Lab:  MAGNESIUM   
  
 01/19/2018 Lab:  METABOLIC PANEL, COMPREHENSIVE   
  
 01/19/2018 Lab:  TSH 3RD GENERATION   
  
 01/19/2018 Lab:  VITAMIN B12 Patient Instructions Please contact our office if you have any questions about your visit today. Introducing [facility]! [facility] introduces [facility] patient portal. Now you can access parts of your medical record, email your doctor's office, and request medication refills online. 1. In your internet browser, go to [email or web address] 2. Click on the First Time User? Click Here link in the Sign In box. You will see the New Member Sign Up page. 3. Enter your [employer] Access Code exactly as it appears below. You will not need to use this code after youve completed the sign-up process. If you do not sign up before the expiration date, you must request a new code. · [employer] Access Code: [email or web address] Expires: 1/17/2018 10:24 AM 
 
4. Enter the last four digits of your Social Security Number (xxxx) and Date of Birth (mm/dd/yyyy) as indicated and click Submit. You will be taken to the next sign-up page. 5. Create a [facility] ID.  This will be your [employer] login ID and cannot be changed, so think of one that is secure and easy to remember. 6. Create a [employer]9 password. You can change your password at any time. 7. Enter your Password Reset Question and Answer. This can be used at a later time if you forget your password. 8. Enter your e-mail address. You will receive e-mail notification when new information is available in [address] 9. Click Sign Up. You can now view and download portions of your medical record. 10. Click the Download Summary menu link to download a portable copy of your medical information. If you have questions, please visit the Frequently Asked Questions section of the [employer] website. Remember, [employer] is NOT to be used for urgent needs. For medical emergencies, dial 911. Now available from your iPhone and Android! Please provide this summary of care documentation to your next provider. Your primary care clinician is listed as TORI HARDING. If you have any questions after today's visit, please call [phone].

## 2017-10-19 NOTE — PROGRESS NOTES
HISTORY OF PRESENT ILLNESS  Sonido Batista is a 49 y.o. male. Chief Complaint   Patient presents with    Other     impulse control disorder, anger and irritability   Chronic problems impulse control disorder anxiety depression stable has not had any major outbursts. Overall doing fairly well and reports compliance with his medication    HPI  Past Medical History:   Diagnosis Date    Anxiety     Depression     Elevated BP     Glaucoma     Hypokalemia     Hyponatraemia     Impulse control disorder     Other specific developmental learning difficulties     Seizure [facility])     age 6/ mid age 10-17.  Speech impediment      Current Outpatient Prescriptions   Medication Sig Dispense Refill    traZODone (DESYREL) 100 mg tablet       gabapentin (NEURONTIN) 300 mg capsule TAKE ONE CAPSULE BY MOUTH THREE TIMES DAILY 90 Cap 0    clonazePAM (KLONOPIN) 1 mg tablet TAKE ONE-HALF TO ONE TABLET BY MOUTH TWICE DAILY AS NEEDED 60 Tab 2    sertraline (ZOLOFT) 100 mg tablet TAKE TWO TABLETS BY MOUTH ONCE DAILY (Patient taking differently: Take 150 mg by mouth daily.) 60 Tab 3    travoprost (TRAVATAN) 0.004 % ophthalmic solution Administer 1 Drop to both eyes every evening.  loratadine (CLARITIN) 10 mg tablet Take 10 mg by mouth as needed. No Known Allergies    Review of Systems   Constitutional: Negative for chills and fever. Respiratory: Negative for shortness of breath. Psychiatric/Behavioral: Positive for depression. The patient has insomnia. Visit Vitals    /85 (BP 1 Location: Right arm, BP Patient Position: Sitting)    Pulse 92    Temp 98.6 °F (37 °C) (Oral)    Resp 20    Ht 6' 1\" (1.854 m)    Wt 252 lb (114.3 kg)    BMI 33.25 kg/m2       Physical Exam  Nursing note and vitals reviewed. Constitutional: He is oriented to person, place, and time. He appears well-developed and well-nourished. No distress.    HENT:   Mouth/Throat: Oropharynx is clear and moist.   Neck: No JVD present. No thyromegaly present. Cardiovascular: Normal rate, regular rhythm and normal heart sounds. Pulmonary/Chest: Effort normal and breath sounds normal. No respiratory distress. He has no wheezes. He has no rales. Abdominal: Soft. Bowel sounds are normal. He exhibits no distension. There is no tenderness. There is no rebound. Musculoskeletal: He exhibits no edema and no tenderness. Lymphadenopathy:     He has no cervical adenopathy. Neurological: He is alert and oriented to person, place, and time. Skin: No pallor. Psychiatric: He has a normal mood and affect. His behavior is normal.    ASSESSMENT and PLAN    ICD-10-CM ICD-9-CM    1. Impulse control disorder F63.9 312.30 LIPID PANEL      METABOLIC PANEL, COMPREHENSIVE      CBC WITH AUTOMATED DIFF      VITAMIN B12      MAGNESIUM      TSH 3RD GENERATION      HEMOGLOBIN A1C W/O EAG   2. Irritability and anger R45.4 799.22 LIPID PANEL      METABOLIC PANEL, COMPREHENSIVE      CBC WITH AUTOMATED DIFF      VITAMIN B12      MAGNESIUM      TSH 3RD GENERATION      HEMOGLOBIN A1C W/O EAG   3. Anxiety F41.9 300.00 LIPID PANEL      METABOLIC PANEL, COMPREHENSIVE      CBC WITH AUTOMATED DIFF      VITAMIN B12      MAGNESIUM      TSH 3RD GENERATION      HEMOGLOBIN A1C W/O EAG   4. Other depression F32.89 311 LIPID PANEL      METABOLIC PANEL, COMPREHENSIVE      CBC WITH AUTOMATED DIFF      VITAMIN B12      MAGNESIUM      TSH 3RD GENERATION      HEMOGLOBIN A1C W/O EAG   5. Hypertriglyceridemia E78.1 272.1 LIPID PANEL      METABOLIC PANEL, COMPREHENSIVE      CBC WITH AUTOMATED DIFF      VITAMIN B12      MAGNESIUM      TSH 3RD GENERATION      HEMOGLOBIN A1C W/O EAG   6. Hyperglycemia R73.9 790.29 LIPID PANEL      METABOLIC PANEL, COMPREHENSIVE      CBC WITH AUTOMATED DIFF      VITAMIN B12      MAGNESIUM      TSH 3RD GENERATION      HEMOGLOBIN A1C W/O EAG   7.  Medication monitoring encounter Z51.81 V58.83 LIPID PANEL      METABOLIC PANEL, COMPREHENSIVE      CBC WITH AUTOMATED DIFF      VITAMIN B12      MAGNESIUM      TSH 3RD GENERATION      HEMOGLOBIN A1C W/O EAG   stable Follow-up Disposition:  Return in about 3 months (around 1/19/2018).  labs prior

## 2017-10-19 NOTE — PROGRESS NOTES
Chief Complaint   Patient presents with    Other     impulse control disorder, anger and irritability       Health Maintenance Due   Topic Date Due    DTaP/Tdap/Td series (1 - Tdap) 01/10/1990    INFLUENZA AGE 9 TO ADULT  08/01/2017       Health Maintenance reviewed     1. Have you been to the ER, urgent care clinic since your last visit? Hospitalized since your last visit? No    2. Have you seen or consulted any other health care providers outside of the [address] [place] [address] since your last visit? Include any pap smears or colon screening.  No

## 2018-01-15 ENCOUNTER — HOSPITAL ENCOUNTER (OUTPATIENT)
Dept: LAB | Age: 48
Discharge: HOME | End: 2018-01-15

## 2018-01-15 PROCEDURE — 99001 SPECIMEN HANDLING PT-LAB: CPT | Performed by: FAMILY MEDICINE

## 2018-01-16 LAB
ALBUMIN SERPL-MCNC: 4.5 G/DL (ref 3.5–5.5)
ALBUMIN/GLOB SERPL: 1.9 {RATIO} (ref 1.2–2.2)
ALP SERPL-CCNC: 50 IU/L (ref 39–117)
ALT SERPL-CCNC: 39 IU/L (ref 0–44)
AST SERPL-CCNC: 31 IU/L (ref 0–40)
BASOPHILS # BLD AUTO: 0 X10E3/UL (ref 0–0.2)
BASOPHILS NFR BLD AUTO: 0 %
BILIRUB SERPL-MCNC: 0.6 MG/DL (ref 0–1.2)
BUN SERPL-MCNC: 10 MG/DL (ref 6–24)
BUN/CREAT SERPL: 10 (ref 9–20)
CALCIUM SERPL-MCNC: 9 MG/DL (ref 8.7–10.2)
CHLORIDE SERPL-SCNC: 99 MMOL/L (ref 96–106)
CHOLEST SERPL-MCNC: 158 MG/DL (ref 100–199)
CO2 SERPL-SCNC: 26 MMOL/L (ref 18–29)
CREAT SERPL-MCNC: 1 MG/DL (ref 0.76–1.27)
EOSINOPHIL # BLD AUTO: 0.1 X10E3/UL (ref 0–0.4)
EOSINOPHIL NFR BLD AUTO: 1 %
ERYTHROCYTE [DISTWIDTH] IN BLOOD BY AUTOMATED COUNT: 14.1 % (ref 12.3–15.4)
GLOBULIN SER CALC-MCNC: 2.4 G/DL (ref 1.5–4.5)
GLUCOSE SERPL-MCNC: 106 MG/DL (ref 65–99)
HBA1C MFR BLD: 5.6 % (ref 4.8–5.6)
HCT VFR BLD AUTO: 48.7 % (ref 37.5–51)
HDLC SERPL-MCNC: 31 MG/DL
HGB BLD-MCNC: 16.7 G/DL (ref 13–17.7)
IMM GRANULOCYTES # BLD: 0 X10E3/UL (ref 0–0.1)
IMM GRANULOCYTES NFR BLD: 0 %
INTERPRETATION, 910389: NORMAL
LDLC SERPL CALC-MCNC: 102 MG/DL (ref 0–99)
LYMPHOCYTES # BLD AUTO: 1.7 X10E3/UL (ref 0.7–3.1)
LYMPHOCYTES NFR BLD AUTO: 25 %
MAGNESIUM SERPL-MCNC: 2 MG/DL (ref 1.6–2.3)
MCH RBC QN AUTO: 28.8 PG (ref 26.6–33)
MCHC RBC AUTO-ENTMCNC: 34.3 G/DL (ref 31.5–35.7)
MCV RBC AUTO: 84 FL (ref 79–97)
MONOCYTES # BLD AUTO: 0.3 X10E3/UL (ref 0.1–0.9)
MONOCYTES NFR BLD AUTO: 5 %
NEUTROPHILS # BLD AUTO: 4.7 X10E3/UL (ref 1.4–7)
NEUTROPHILS NFR BLD AUTO: 69 %
PLATELET # BLD AUTO: 178 X10E3/UL (ref 150–379)
POTASSIUM SERPL-SCNC: 4.4 MMOL/L (ref 3.5–5.2)
PROT SERPL-MCNC: 6.9 G/DL (ref 6–8.5)
RBC # BLD AUTO: 5.8 X10E6/UL (ref 4.14–5.8)
SODIUM SERPL-SCNC: 140 MMOL/L (ref 134–144)
TRIGL SERPL-MCNC: 127 MG/DL (ref 0–149)
TSH SERPL DL<=0.005 MIU/L-ACNC: 1.42 UIU/ML (ref 0.45–4.5)
VIT B12 SERPL-MCNC: 453 PG/ML (ref 232–1245)
VLDLC SERPL CALC-MCNC: 25 MG/DL (ref 5–40)
WBC # BLD AUTO: 6.8 X10E3/UL (ref 3.4–10.8)

## 2018-06-12 ENCOUNTER — HOSPITAL ENCOUNTER (OUTPATIENT)
Dept: LAB | Age: 48
Discharge: HOME | End: 2018-06-12

## 2018-06-12 ENCOUNTER — OFFICE VISIT (OUTPATIENT)
Dept: FAMILY MEDICINE CLINIC | Age: 48
End: 2018-06-12

## 2018-06-12 VITALS
OXYGEN SATURATION: 97 % | SYSTOLIC BLOOD PRESSURE: 136 MMHG | HEIGHT: 73 IN | HEART RATE: 70 BPM | DIASTOLIC BLOOD PRESSURE: 73 MMHG | RESPIRATION RATE: 18 BRPM | WEIGHT: 253 LBS | BODY MASS INDEX: 33.53 KG/M2 | TEMPERATURE: 96.9 F

## 2018-06-12 DIAGNOSIS — R73.03 PREDIABETES: Primary | ICD-10-CM

## 2018-06-12 DIAGNOSIS — M54.5 ACUTE LOW BACK PAIN, UNSPECIFIED BACK PAIN LATERALITY, WITH SCIATICA PRESENCE UNSPECIFIED: ICD-10-CM

## 2018-06-12 DIAGNOSIS — M54.6 ACUTE LEFT-SIDED THORACIC BACK PAIN: ICD-10-CM

## 2018-06-12 DIAGNOSIS — E78.1 HYPERTRIGLYCERIDEMIA: ICD-10-CM

## 2018-06-12 LAB
BILIRUB UR QL STRIP: NEGATIVE
GLUCOSE UR-MCNC: NEGATIVE MG/DL
KETONES P FAST UR STRIP-MCNC: NEGATIVE MG/DL
PH UR STRIP: 5.5 [PH] (ref 4.6–8)
PROT UR QL STRIP: NEGATIVE
SP GR UR STRIP: 1.02 (ref 1–1.03)
UA UROBILINOGEN AMB POC: NORMAL (ref 0.2–1)
URINALYSIS CLARITY POC: CLEAR
URINALYSIS COLOR POC: YELLOW
URINE BLOOD POC: NEGATIVE
URINE LEUKOCYTES POC: NEGATIVE
URINE NITRITES POC: NEGATIVE

## 2018-06-12 PROCEDURE — 99001 SPECIMEN HANDLING PT-LAB: CPT | Performed by: NURSE PRACTITIONER

## 2018-06-12 NOTE — PROGRESS NOTES
JAROD  Lili Augustin is a 51 y.o. male  Chief Complaint   Patient presents with    Follow-up     Patient last  seen in office by PCP 10/19/2017    Back Pain     Patient has complaints of left sided back pain that started 6/11/2018 and goes around the front. Patient reports no issues with urination or pain with urination    Labs     Patient has not had labs 1/2018 has fasted     Reports reports upper left sided back pain for one day. Denies radiation of pain. Denies bowel or bladder discomfort. Denies injury or falls. Reports pain level 5/10. Reports taking tylenol. Unsure if tylenol is effective. Unable to describe pain. Admits pain occurs with movement. Breathing does not aggravate or cause the pain. Denies chest pain. Requesting labs. Admits to prediabetes and high cholesterol. Past Medical History  Past Medical History:   Diagnosis Date    Anxiety     Depression     Elevated BP     Glaucoma     Hypokalemia     Hyponatraemia     Impulse control disorder     Other specific developmental learning difficulties     Seizure [facility])     age 6/ mid age 10-17.  Speech impediment        Surgical History  No past surgical history on file. Medications  Current Outpatient Prescriptions   Medication Sig Dispense Refill    traZODone (DESYREL) 100 mg tablet       gabapentin (NEURONTIN) 300 mg capsule TAKE ONE CAPSULE BY MOUTH THREE TIMES DAILY 90 Cap 0    clonazePAM (KLONOPIN) 1 mg tablet TAKE ONE-HALF TO ONE TABLET BY MOUTH TWICE DAILY AS NEEDED 60 Tab 2    sertraline (ZOLOFT) 100 mg tablet TAKE TWO TABLETS BY MOUTH ONCE DAILY (Patient taking differently: Take 150 mg by mouth daily.) 60 Tab 3    travoprost (TRAVATAN) 0.004 % ophthalmic solution Administer 1 Drop to both eyes every evening.  loratadine (CLARITIN) 10 mg tablet Take 10 mg by mouth as needed. Allergies  No Known Allergies    Family History  No family history on file.     Social History  Social History     Social History    Marital status: SINGLE     Spouse name: N/A    Number of children: N/A    Years of education: N/A     Occupational History    disability      Social History Main Topics    Smoking status: Never Smoker    Smokeless tobacco: Never Used    Alcohol use No    Drug use: Not on file    Sexual activity: Not on file     Other Topics Concern    Not on file     Social History Narrative       Problem List  Patient Active Problem List   Diagnosis Code    Impulse control disorder F63.9    Depression F32.9    Anxiety F41.9    Insomnia G47.00    Irritability and anger R45.4    AR (allergic rhinitis) J30.9    Glaucoma, open angle, Dr. Farrah Muniz H40.10X0    Cataract H26.9    Atypical chest pain R07.89    Prediabetes R73.03    Hypertriglyceridemia E78.1    ACP (advance care planning) Z71.89       Review of Systems  Review of Systems   Respiratory: Negative for shortness of breath. Cardiovascular: Negative for chest pain. Gastrointestinal: Negative for abdominal pain, nausea and vomiting. Musculoskeletal: Positive for back pain. Negative for falls. Vital Signs  Vitals:    06/12/18 1052   BP: 136/73   Pulse: 70   Resp: 18   Temp: 96.9 °F (36.1 °C)   TempSrc: Oral   SpO2: 97%   Weight: 253 lb (114.8 kg)   Height: 6' 1\" (1.854 m)   PainSc:   5   PainLoc: Back       Physical Exam  Physical Exam   Constitutional: He is oriented to person, place, and time. HENT:   Mouth/Throat: Oropharynx is clear and moist.   Cardiovascular: Normal rate, regular rhythm and normal heart sounds. Pulmonary/Chest: Effort normal and breath sounds normal. No respiratory distress. He exhibits no tenderness. Abdominal: Soft. Bowel sounds are normal. He exhibits no distension. There is no tenderness. There is no CVA tenderness. Neurological: He is alert and oriented to person, place, and time. Psychiatric: He has a normal mood and affect. Vitals reviewed.       Diagnostics  Orders Placed This Encounter    CBC WITH AUTOMATED DIFF     Standing Status:   Future     Number of Occurrences:   1     Standing Expiration Date:   4/56/1164    METABOLIC PANEL, COMPREHENSIVE     Standing Status:   Future     Number of Occurrences:   1     Standing Expiration Date:   12/10/2018    HEMOGLOBIN A1C WITH EAG     Standing Status:   Future     Number of Occurrences:   1     Standing Expiration Date:   6/13/2019    LIPID PANEL     Standing Status:   Future     Number of Occurrences:   1     Standing Expiration Date:   12/10/2018    AMB POC URINALYSIS DIP STICK MANUAL W/O MICRO       Results  Results for orders placed or performed in visit on 06/12/18   AMB POC URINALYSIS DIP STICK MANUAL W/O MICRO   Result Value Ref Range    Color (UA POC) Yellow     Clarity (UA POC) Clear     Glucose (UA POC) Negative Negative    Bilirubin (UA POC) Negative Negative    Ketones (UA POC) Negative Negative    Specific gravity (UA POC) 1.025 1.001 - 1.035    Blood (UA POC) Negative Negative    pH (UA POC) 5.5 4.6 - 8.0    Protein (UA POC) Negative Negative    Urobilinogen (UA POC) 0.2 mg/dL 0.2 - 1    Nitrites (UA POC) Negative Negative    Leukocyte esterase (UA POC) Negative Negative           Assessment and Plan  Diagnoses and all orders for this visit:    1. Prediabetes  -     CBC WITH AUTOMATED DIFF; Future  -     METABOLIC PANEL, COMPREHENSIVE; Future  -     HEMOGLOBIN A1C WITH EAG; Future  -     LIPID PANEL; Future    2. Hypertriglyceridemia  -     CBC WITH AUTOMATED DIFF; Future  -     METABOLIC PANEL, COMPREHENSIVE; Future  -     HEMOGLOBIN A1C WITH EAG; Future  -     LIPID PANEL; Future    3. Acute left-sided thoracic back pain    4. Acute low back pain, unspecified back pain laterality, with sciatica presence unspecified  -     AMB POC URINALYSIS DIP STICK MANUAL W/O MICRO      Patient to take OTC ibuprofen for back pain. After care summary printed and reviewed with patient. Plan reviewed with patient. Questions answered.  Patient verbalized understanding of plan and is in agreement with plan. Patient to follow up in four months or earlier if symptoms worsen.      Catherine Cee, CHAZ-C

## 2018-06-12 NOTE — PROGRESS NOTES
Chief Complaint   Patient presents with    Follow-up     Patient last  seen in office by PCP 10/19/2017    Back Pain     Patient has complaints of left sided back pain that started 6/11/2018 and goes around the front. Patient reports no issues with urination or pain with urination    Labs     Patient has not had labs 1/2018 has fasted     1. Have you been to the ER, urgent care clinic since your last visit? Hospitalized since your last visit? No    2. Have you seen or consulted any other health care providers outside of the [address] [place] [address] since your last visit? Include any pap smears or colon screening.  No     Health Maintenance Due   Topic Date Due    DTaP/Tdap/Td series (1 - Tdap) 01/10/1990    MEDICARE YEARLY EXAM  06/09/2018

## 2018-06-12 NOTE — MR AVS SNAPSHOT
[address] [facility] 
 
 
 [place] [address] [address] [address] [address] [phone] Patient: Heidi Olvera MRN:  [id number] Visit Information Date & Time Provider Department Dept. Phone Encounter #  
 6/12/2018 10:45 AM Bradly Hung NP [address] [phone] [id number] Follow-up Instructions Return in about 4 months (around 10/12/2018). Upcoming Health Maintenance Date Due DTaP/Tdap/Td series (1 - Tdap) 1/10/1990 MEDICARE YEARLY EXAM 6/9/2018 Influenza Age 5 to Adult 8/1/2018 Allergies as of 6/12/2018  Review Complete On: 6/12/2018 By: Bradly Hung NP No Known Allergies Current Immunizations  Reviewed on 6/8/2017 No immunizations on file. Not reviewed this visit You Were Diagnosed With   
  
 Codes Comments Prediabetes    -  Primary ICD-10-CM: R73.03 
ICD-9-CM: 790.29 Hypertriglyceridemia     ICD-10-CM: E78.1 ICD-9-CM: 272.1 Acute left-sided thoracic back pain     ICD-10-CM: M54.6 ICD-9-CM: 724.1 Acute low back pain, unspecified back pain laterality, with sciatica presence unspecified     ICD-10-CM: M54.5 ICD-9-CM: 724.2 Vitals BP Pulse Temp Resp Height(growth percentile) Weight(growth percentile) 136/73 (BP 1 Location: Left arm, BP Patient Position: Sitting) 70 96.9 °F (36.1 °C) (Oral) 18 6' 1\" (1.854 m) 253 lb (114.8 kg) SpO2 BMI Smoking Status 97% 33.38 kg/m2 Never Smoker BMI and BSA Data Body Mass Index Body Surface Area  
 33.38 kg/m 2 2.43 m 2 Preferred Pharmacy Pharmacy Name Phone Taran Varner [address] [place] [address], [facility] [phone] Your Updated Medication List  
  
   
This list is accurate as of 6/12/18 11:33 AM.  Always use your most recent med list.  
  
  
  
  
 CLARITIN 10 mg tablet Generic drug:  loratadine Take 10 mg by mouth as needed. clonazePAM 1 mg tablet Commonly known as:  KlonoPIN  
TAKE ONE-HALF TO ONE TABLET BY MOUTH TWICE DAILY AS NEEDED  
  
 gabapentin 300 mg capsule Commonly known as:  NEURONTIN  
TAKE ONE CAPSULE BY MOUTH THREE TIMES DAILY  
  
 sertraline 100 mg tablet Commonly known as:  ZOLOFT  
TAKE TWO TABLETS BY MOUTH ONCE DAILY  
  
 travoprost 0.004 % ophthalmic solution Commonly known as:  TRAVATAN Z Administer 1 Drop to both eyes every evening. traZODone 100 mg tablet Commonly known as:  Virane Dus We Performed the Following AMB POC URINALYSIS DIP STICK MANUAL W/O MICRO [77737 CPT(R)] Follow-up Instructions Return in about 4 months (around 10/12/2018). To-Do List   
 06/12/2018 Lab:  CBC WITH AUTOMATED DIFF   
  
 06/12/2018 Lab:  HEMOGLOBIN A1C WITH EAG Around 09/10/2018 Lab:  LIPID PANEL Around 09/10/2018 Lab:  METABOLIC PANEL, COMPREHENSIVE Patient Instructions Please contact our office if you have any questions about your visit today. Introducing \[facility]\"" & HEALTH SERVICES! Martha Chairez introduces ThinkVidya patient portal. Now you can access parts of your medical record, email your doctor's office, and request medication refills online. 1. In your internet browser, go to [email or web address] 2. Click on the First Time User? Click Here link in the Sign In box. You will see the New Member Sign Up page. 3. Enter your ThinkVidya Access Code exactly as it appears below. You will not need to use this code after youve completed the sign-up process. If you do not sign up before the expiration date, you must request a new code. · Think[employer]id[employer] Access Code: [email or web address] Expires: 9/10/2018 11:33 AM 
 
4. Enter the last four digits of your Social Security Number (xxxx) and Date of Birth (mm/dd/yyyy) as indicated and click Submit. You will be taken to the next sign-up page. 5. Create a Trufa ID. This will be your Trufa login ID and cannot be changed, so think of one that is secure and easy to remember. 6. Create a Trufa password. You can change your password at any time. 7. Enter your Password Reset Question and Answer. This can be used at a later time if you forget your password. 8. Enter your e-mail address. You will receive e-mail notification when new information is available in [address] 9. Click Sign Up. You can now view and download portions of your medical record. 10. Click the Download Summary menu link to download a portable copy of your medical information. If you have questions, please visit the Frequently Asked Questions section of the Trufa website. Remember, Trufa is NOT to be used for urgent needs. For medical emergencies, dial 911. Now available from your iPhone and Android! Please provide this summary of care documentation to your next provider. Your primary care clinician is listed as TORI HARDING. If you have any questions after today's visit, please call [phone].

## 2018-06-13 LAB
ALBUMIN SERPL-MCNC: 4.4 G/DL (ref 3.5–5.5)
ALBUMIN/GLOB SERPL: 1.5 {RATIO} (ref 1.2–2.2)
ALP SERPL-CCNC: 54 IU/L (ref 39–117)
ALT SERPL-CCNC: 41 IU/L (ref 0–44)
AST SERPL-CCNC: 23 IU/L (ref 0–40)
BASOPHILS # BLD AUTO: 0 X10E3/UL (ref 0–0.2)
BASOPHILS NFR BLD AUTO: 1 %
BILIRUB SERPL-MCNC: 0.4 MG/DL (ref 0–1.2)
BUN SERPL-MCNC: 11 MG/DL (ref 6–24)
BUN/CREAT SERPL: 11 (ref 9–20)
CALCIUM SERPL-MCNC: 9 MG/DL (ref 8.7–10.2)
CHLORIDE SERPL-SCNC: 98 MMOL/L (ref 96–106)
CHOLEST SERPL-MCNC: 167 MG/DL (ref 100–199)
CO2 SERPL-SCNC: 24 MMOL/L (ref 20–29)
CREAT SERPL-MCNC: 0.99 MG/DL (ref 0.76–1.27)
EOSINOPHIL # BLD AUTO: 0.2 X10E3/UL (ref 0–0.4)
EOSINOPHIL NFR BLD AUTO: 2 %
ERYTHROCYTE [DISTWIDTH] IN BLOOD BY AUTOMATED COUNT: 14.8 % (ref 12.3–15.4)
EST. AVERAGE GLUCOSE BLD GHB EST-MCNC: 126 MG/DL
GFR SERPLBLD CREATININE-BSD FMLA CKD-EPI: 103 ML/MIN/1.73
GFR SERPLBLD CREATININE-BSD FMLA CKD-EPI: 89 ML/MIN/1.73
GLOBULIN SER CALC-MCNC: 3 G/DL (ref 1.5–4.5)
GLUCOSE SERPL-MCNC: 146 MG/DL (ref 65–99)
HBA1C MFR BLD: 6 % (ref 4.8–5.6)
HCT VFR BLD AUTO: 47.9 % (ref 37.5–51)
HDLC SERPL-MCNC: 33 MG/DL
HGB BLD-MCNC: 16.7 G/DL (ref 13–17.7)
IMM GRANULOCYTES # BLD: 0 X10E3/UL (ref 0–0.1)
IMM GRANULOCYTES NFR BLD: 0 %
INTERPRETATION, 910389: NORMAL
LDLC SERPL CALC-MCNC: 89 MG/DL (ref 0–99)
LYMPHOCYTES # BLD AUTO: 1.9 X10E3/UL (ref 0.7–3.1)
LYMPHOCYTES NFR BLD AUTO: 28 %
MCH RBC QN AUTO: 28.8 PG (ref 26.6–33)
MCHC RBC AUTO-ENTMCNC: 34.9 G/DL (ref 31.5–35.7)
MCV RBC AUTO: 83 FL (ref 79–97)
MONOCYTES # BLD AUTO: 0.4 X10E3/UL (ref 0.1–0.9)
MONOCYTES NFR BLD AUTO: 6 %
NEUTROPHILS # BLD AUTO: 4.1 X10E3/UL (ref 1.4–7)
NEUTROPHILS NFR BLD AUTO: 63 %
PLATELET # BLD AUTO: 186 X10E3/UL (ref 150–379)
POTASSIUM SERPL-SCNC: 4.4 MMOL/L (ref 3.5–5.2)
PROT SERPL-MCNC: 7.4 G/DL (ref 6–8.5)
RBC # BLD AUTO: 5.79 X10E6/UL (ref 4.14–5.8)
SODIUM SERPL-SCNC: 142 MMOL/L (ref 134–144)
TRIGL SERPL-MCNC: 223 MG/DL (ref 0–149)
VLDLC SERPL CALC-MCNC: 45 MG/DL (ref 5–40)
WBC # BLD AUTO: 6.6 X10E3/UL (ref 3.4–10.8)

## 2018-06-18 NOTE — PROGRESS NOTES
Please contact patient and inform him that his glucose was elevated at 146 and his hemoglobin A1C is 6.0 indicating some prediabetes. His triglycerides have increased to 223 and his bad cholesterol is down at 33 and this should be >39. Initiate lifestyle changes such as exercise at least 3 times a week at 45 min intervals along with a diet low in saturated fats. Increase soluble fiber in diet such as fruit and vegetables will help lower his triglycerides, increase his HDL, and lower his hemoglobin A1C. I encourage weight reduction, limiting or avoiding any alcohol products and aerobic exercise.  [facility]

## 2018-06-18 NOTE — PROGRESS NOTES
Please inform him that his labs will need to be repeated in six months for his cholesterol level and in three months for his hemoglobin A1C. Ask patient to scheduled follow up appointments.  Rosalina Chung

## 2018-10-23 ENCOUNTER — OFFICE VISIT (OUTPATIENT)
Dept: FAMILY MEDICINE CLINIC | Age: 48
End: 2018-10-23

## 2018-10-23 VITALS
HEIGHT: 73 IN | TEMPERATURE: 98.5 F | RESPIRATION RATE: 20 BRPM | BODY MASS INDEX: 33.13 KG/M2 | WEIGHT: 250 LBS | DIASTOLIC BLOOD PRESSURE: 80 MMHG | HEART RATE: 77 BPM | SYSTOLIC BLOOD PRESSURE: 122 MMHG

## 2018-10-23 DIAGNOSIS — E78.1 HYPERTRIGLYCERIDEMIA: ICD-10-CM

## 2018-10-23 DIAGNOSIS — R73.03 PREDIABETES: ICD-10-CM

## 2018-10-23 DIAGNOSIS — R73.03 PREDIABETES: Primary | ICD-10-CM

## 2018-10-23 LAB — HBA1C MFR BLD HPLC: 5.6 %

## 2018-10-23 NOTE — PATIENT INSTRUCTIONS
Please contact our office if you have any questions about your visit today. Body Mass Index: Care Instructions Your Care Instructions Body mass index (BMI) can help you see if your weight is raising your risk for health problems. It uses a formula to compare how much you weigh with how tall you are. · A BMI lower than 18.5 is considered underweight. · A BMI between 18.5 and 24.9 is considered healthy. · A BMI between 25 and 29.9 is considered overweight. A BMI of 30 or higher is considered obese. If your BMI is in the normal range, it means that you have a lower risk for weight-related health problems. If your BMI is in the overweight or obese range, you may be at increased risk for weight-related health problems, such as high blood pressure, heart disease, stroke, arthritis or joint pain, and diabetes. If your BMI is in the underweight range, you may be at increased risk for health problems such as fatigue, lower protection (immunity) against illness, muscle loss, bone loss, hair loss, and hormone problems. BMI is just one measure of your risk for weight-related health problems. You may be at higher risk for health problems if you are not active, you eat an unhealthy diet, or you drink too much alcohol or use tobacco products. Follow-up care is a key part of your treatment and safety. Be sure to make and go to all appointments, and call your doctor if you are having problems. It's also a good idea to know your test results and keep a list of the medicines you take. How can you care for yourself at home? · Practice healthy eating habits. This includes eating plenty of fruits, vegetables, whole grains, lean protein, and low-fat dairy. · If your doctor recommends it, get more exercise. Walking is a good choice. Bit by bit, increase the amount you walk every day. Try for at least 30 minutes on most days of the week. · Do not smoke. Smoking can increase your risk for health problems.  If you need help quitting, talk to your doctor about stop-smoking programs and medicines. These can increase your chances of quitting for good. · Limit alcohol to 2 drinks a day for men and 1 drink a day for women. Too much alcohol can cause health problems. If you have a BMI higher than 25 · Your doctor may do other tests to check your risk for weight-related health problems. This may include measuring the distance around your waist. A waist measurement of more than 40 inches in men or 35 inches in women can increase the risk of weight-related health problems. · Talk with your doctor about steps you can take to stay healthy or improve your health. You may need to make lifestyle changes to lose weight and stay healthy, such as changing your diet and getting regular exercise. If you have a BMI lower than 18.5 · Your doctor may do other tests to check your risk for health problems. · Talk with your doctor about steps you can take to stay healthy or improve your health. You may need to make lifestyle changes to gain or maintain weight and stay healthy, such as getting more healthy foods in your diet and doing exercises to build muscle. Where can you learn more? Go to http://kitty-favian.info/. Enter S176 in the search box to learn more about \"Body Mass Index: Care Instructions. \" Current as of: October 13, 2016 Content Version: 11.4 © 4259-8598 Healthwise, Incorporated. Care instructions adapted under license by [employer] (which disclaims liability or warranty for this information). If you have questions about a medical condition or this instruction, always ask your healthcare professional. [place] [address] any warranty or liability for your use of this information.

## 2018-10-23 NOTE — PROGRESS NOTES
Chief Complaint Patient presents with  Blood sugar problem  
  prediabetes  Cholesterol Problem  
  high chol Health Maintenance Due Topic Date Due  
 DTaP/Tdap/Td series (1 - Tdap) 01/10/1990  Influenza Age 5 to Adult  08/01/2018  MEDICARE YEARLY EXAM  10/12/2018 Health Maintenance reviewed 1. Have you been to the ER, urgent care clinic since your last visit? Hospitalized since your last visit? No 
 
2. Have you seen or consulted any other health care providers outside of the [address] [place] [address] since your last visit? Include any pap smears or colon screening. No  
 
 
PHQ, abuse and learning screenings updated

## 2018-10-23 NOTE — PROGRESS NOTES
JAROD Andrews is a 51 y.o. male Chief Complaint Patient presents with  Blood sugar problem  
  prediabetes  Cholesterol Problem  
  high chol Accompanied by his brother. Patient denies chest pain, shortness of breath, blurry vision, dizziness, increased urination, tremors, nausea, vomiting, or problems with urination. Requesting labs Past Medical History Past Medical History:  
Diagnosis Date  Anxiety  Depression  Elevated BP   
 Glaucoma  Hypokalemia  Hyponatraemia  Impulse control disorder  Other specific developmental learning difficulties  Seizure [facility])   
 age 6/ mid age 10-17.  Speech impediment Surgical History History reviewed. No pertinent surgical history. Medications Current Outpatient Medications Medication Sig Dispense Refill  traZODone (DESYREL) 100 mg tablet  gabapentin (NEURONTIN) 300 mg capsule TAKE ONE CAPSULE BY MOUTH THREE TIMES DAILY 90 Cap 0  clonazePAM (KLONOPIN) 1 mg tablet TAKE ONE-HALF TO ONE TABLET BY MOUTH TWICE DAILY AS NEEDED 60 Tab 2  
 sertraline (ZOLOFT) 100 mg tablet TAKE TWO TABLETS BY MOUTH ONCE DAILY (Patient taking differently: Take 150 mg by mouth daily.) 60 Tab 3  
 travoprost (TRAVATAN) 0.004 % ophthalmic solution Administer 1 Drop to both eyes every evening.  loratadine (CLARITIN) 10 mg tablet Take 10 mg by mouth as needed. Allergies No Known Allergies Family History History reviewed. No pertinent family history. Social History Social History Socioeconomic History  Marital status: SINGLE Spouse name: Not on file  Number of children: Not on file  Years of education: Not on file  Highest education level: Not on file Social Needs  Financial resource strain: Not on file  Food insecurity - worry: Not on file  Food insecurity - inability: Not on file  Transportation needs - medical: Not on file  Transportation needs - non-medical: Not on file Occupational History  Occupation: disability Tobacco Use  Smoking status: Never Smoker  Smokeless tobacco: Never Used Substance and Sexual Activity  Alcohol use: No  
 Drug use: Not on file  Sexual activity: Not on file Other Topics Concern  Not on file Social History Narrative  Not on file Problem List 
Patient Active Problem List  
Diagnosis Code  Impulse control disorder F63.9  Depression F32.9  Anxiety F41.9  Insomnia G47.00  
 Irritability and anger R45.4  AR (allergic rhinitis) J30.9  Glaucoma, open angle, Dr. Melanie Osman H40.10X0  Cataract H26.9  Atypical chest pain R07.89  
 Prediabetes R73.03  
 Hypertriglyceridemia E78.1  ACP (advance care planning) Z71.89 Review of Systems Review of Systems Constitutional: Negative for chills and fever. Respiratory: Negative for shortness of breath. Cardiovascular: Negative for chest pain. Gastrointestinal: Negative for abdominal pain, nausea and vomiting. Genitourinary: Negative for dysuria, frequency and urgency. Skin: Negative for itching and rash. Neurological: Negative for dizziness and tremors. Endo/Heme/Allergies: Negative for polydipsia. Vital Signs Vitals:  
 10/23/18 1143 BP: 122/80 Pulse: 77 Resp: 20 Temp: 98.5 °F (36.9 °C) TempSrc: Oral  
Weight: 250 lb (113.4 kg) Height: 6' 1\" (1.854 m) PainSc:   0 - No pain Physical Exam 
Physical Exam  
Constitutional: He is oriented to person, place, and time. HENT:  
Mouth/Throat: Oropharynx is clear and moist.  
Eyes: Pupils are equal, round, and reactive to light. Cardiovascular: Normal rate, regular rhythm and normal heart sounds. Pulmonary/Chest: Effort normal and breath sounds normal.  
Abdominal: Soft. Bowel sounds are normal. He exhibits no distension. There is no tenderness. Neurological: He is alert and oriented to person, place, and time. Psychiatric: His mood appears anxious. Diagnostics Orders Placed This Encounter  METABOLIC PANEL, COMPREHENSIVE Standing Status:   Future Standing Expiration Date:   10/24/2019  LIPID PANEL Standing Status:   Future Standing Expiration Date:   10/24/2019  CBC WITH AUTOMATED DIFF Standing Status:   Future Standing Expiration Date:   10/24/2019  
 HEMOGLOBIN A1C WITH EAG Standing Status:   Future Standing Expiration Date:   10/24/2019  AMB POC HEMOGLOBIN A1C Results Results for orders placed or performed in visit on 10/23/18 AMB POC HEMOGLOBIN A1C Result Value Ref Range Hemoglobin A1c (POC) 5.6 % Assessment and Plan Diagnoses and all orders for this visit: 1. Prediabetes -     AMB POC HEMOGLOBIN V1O 
-     METABOLIC PANEL, COMPREHENSIVE; Future -     LIPID PANEL; Future -     CBC WITH AUTOMATED DIFF; Future 
-     HEMOGLOBIN A1C WITH EAG; Future 2. Hypertriglyceridemia -     METABOLIC PANEL, COMPREHENSIVE; Future -     LIPID PANEL; Future -     CBC WITH AUTOMATED DIFF; Future 
-     HEMOGLOBIN A1C WITH EAG; Future 3. BMI 32.0-32.9,adult -     METABOLIC PANEL, COMPREHENSIVE; Future -     LIPID PANEL; Future -     CBC WITH AUTOMATED DIFF; Future 
-     HEMOGLOBIN A1C WITH EAG; Future After care summary printed and reviewed with patient. Plan reviewed with patient. Questions answered. Patient verbalized understanding of plan and is in agreement with plan. Patient to follow up in three months or earlier if symptoms worsen. Fasting labs prior to visit. RIGOBERTO Ramos Discussed the patient's BMI with him. The BMI follow up plan is as follows:  
 
dietary management education, guidance, and counseling 
encourage exercise 
monitor weight 
prescribed dietary intake An After Visit Summary was printed and given to the patient.  
 
RIGOBERTO Ramos

## 2021-01-25 ENCOUNTER — HOSPITAL ENCOUNTER (EMERGENCY)
Age: 51
Discharge: HOME | End: 2021-01-25
Attending: EMERGENCY MEDICINE
Payer: MEDICARE

## 2021-01-25 VITALS
DIASTOLIC BLOOD PRESSURE: 81 MMHG | OXYGEN SATURATION: 97 % | SYSTOLIC BLOOD PRESSURE: 135 MMHG | RESPIRATION RATE: 20 BRPM | TEMPERATURE: 100.4 F | HEART RATE: 101 BPM

## 2021-01-25 DIAGNOSIS — Z20.822 SUSPECTED COVID-19 VIRUS INFECTION: Primary | ICD-10-CM

## 2021-01-25 LAB
FLUAV AG NPH QL IA: NEGATIVE
FLUBV AG NOSE QL IA: NEGATIVE

## 2021-01-25 PROCEDURE — U0003 INFECTIOUS AGENT DETECTION BY NUCLEIC ACID (DNA OR RNA); SEVERE ACUTE RESPIRATORY SYNDROME CORONAVIRUS 2 (SARS-COV-2) (CORONAVIRUS DISEASE [COVID-19]), AMPLIFIED PROBE TECHNIQUE, MAKING USE OF HIGH THROUGHPUT TECHNOLOGIES AS DESCRIBED BY CMS-2020-01-R: HCPCS

## 2021-01-25 PROCEDURE — 99282 EMERGENCY DEPT VISIT SF MDM: CPT

## 2021-01-25 PROCEDURE — 87804 INFLUENZA ASSAY W/OPTIC: CPT

## 2021-01-25 RX ORDER — IBUPROFEN 800 MG/1
800 TABLET ORAL EVERY 8 HOURS
Qty: 15 TAB | Refills: 0 | Status: SHIPPED | OUTPATIENT
Start: 2021-01-25 | End: 2021-01-30

## 2021-01-25 RX ORDER — GUAIFENESIN 600 MG/1
600 TABLET, EXTENDED RELEASE ORAL 2 TIMES DAILY
Qty: 20 TAB | Refills: 0 | Status: SHIPPED | OUTPATIENT
Start: 2021-01-25 | End: 2021-01-27 | Stop reason: ALTCHOICE

## 2021-01-25 RX ORDER — ACETAMINOPHEN 500 MG
1000 TABLET ORAL
Qty: 20 TAB | Refills: 0 | Status: SHIPPED | OUTPATIENT
Start: 2021-01-25

## 2021-01-25 NOTE — ED PROVIDER NOTES
EMERGENCY DEPARTMENT HISTORY AND PHYSICAL EXAM    Date: 1/25/2021  Patient Name: Chau Lopez    History of Presenting Illness     Chief Complaint   Patient presents with    Cough    Fever    Chills         History Provided By: Patient    Additional History (Context): Chau Lopez is a 45 y.o. male with seizure and impulse control disorder who presents with concern with fever body aches and diarrhea for the past couple of days. He is also been coughing. No vomiting. Does not smoke tobacco.    PCP: Herman Liao NP    Current Outpatient Medications   Medication Sig Dispense Refill    ibuprofen (MOTRIN) 800 mg tablet Take 1 Tab by mouth every eight (8) hours for 5 days. 15 Tab 0    acetaminophen (Tylenol Extra Strength) 500 mg tablet Take 2 Tabs by mouth every six (6) hours as needed for Pain. 20 Tab 0    guaiFENesin ER (Mucinex) 600 mg ER tablet Take 1 Tab by mouth two (2) times a day. 20 Tab 0    traZODone (DESYREL) 100 mg tablet       gabapentin (NEURONTIN) 300 mg capsule TAKE ONE CAPSULE BY MOUTH THREE TIMES DAILY 90 Cap 0    clonazePAM (KLONOPIN) 1 mg tablet TAKE ONE-HALF TO ONE TABLET BY MOUTH TWICE DAILY AS NEEDED 60 Tab 2    sertraline (ZOLOFT) 100 mg tablet TAKE TWO TABLETS BY MOUTH ONCE DAILY (Patient taking differently: Take 150 mg by mouth daily.) 60 Tab 3    travoprost (TRAVATAN) 0.004 % ophthalmic solution Administer 1 Drop to both eyes every evening.  loratadine (CLARITIN) 10 mg tablet Take 10 mg by mouth as needed. Past History     Past Medical History:  Past Medical History:   Diagnosis Date    Anxiety     Depression     Elevated BP     Glaucoma     Hypokalemia     Hyponatraemia     Impulse control disorder     Other specific developmental learning difficulties     Seizure [facility])     age 6/ mid age 10-17.  Speech impediment        Past Surgical History:  No past surgical history on file. Family History:  History reviewed.  No pertinent family history. Social History:  Social History     Tobacco Use    Smoking status: Never Smoker    Smokeless tobacco: Never Used   Substance Use Topics    Alcohol use: No    Drug use: Not on file       Allergies:  No Known Allergies      Review of Systems   Review of Systems   Constitutional: Positive for fever. HENT: Negative for sore throat. Respiratory: Positive for cough. Negative for shortness of breath and wheezing. Gastrointestinal: Positive for diarrhea. Negative for abdominal pain, nausea and vomiting. Musculoskeletal: Positive for myalgias. All Other Systems Negative  Physical Exam     Vitals:    01/25/21 1703   BP: 135/81   Pulse: (!) 101   Resp: 20   Temp: 100.4 °F (38 °C)   SpO2: 97%     Physical Exam  Vitals signs and nursing note reviewed. Constitutional:       General: He is not in acute distress. Appearance: He is well-developed. He is obese. He is not ill-appearing, toxic-appearing or diaphoretic. HENT:      Head: Normocephalic and atraumatic. Neck:      Musculoskeletal: Normal range of motion and neck supple. Thyroid: No thyromegaly. Vascular: No carotid bruit. Trachea: No tracheal deviation. Cardiovascular:      Rate and Rhythm: Normal rate and regular rhythm. Heart sounds: Normal heart sounds. No murmur. No friction rub. No gallop. Pulmonary:      Effort: Pulmonary effort is normal. No respiratory distress. Breath sounds: Normal breath sounds. No stridor. No wheezing or rales. Chest:      Chest wall: No tenderness. Abdominal:      General: There is no distension. Palpations: Abdomen is soft. There is no mass. Tenderness: There is no abdominal tenderness. There is no guarding or rebound. Musculoskeletal: Normal range of motion. Skin:     General: Skin is warm and dry. Coloration: Skin is not pale. Neurological:      Mental Status: He is alert.    Psychiatric:         Speech: Speech normal.         Behavior: Behavior normal.         Thought Content: Thought content normal.         Judgment: Judgment normal.          Diagnostic Study Results     Labs -     Recent Results (from the past 12 hour(s))   INFLUENZA A & B AG (RAPID TEST)    Collection Time: 01/25/21  5:06 PM   Result Value Ref Range    Influenza A Antigen Negative NEG      Influenza B Antigen Negative NEG         Radiologic Studies -   No orders to display     CT Results  (Last 48 hours)    None        CXR Results  (Last 48 hours)    None            Medical Decision Making   I am the first provider for this patient. I reviewed the vital signs, available nursing notes, past medical history, past surgical history, family history and social history. Vital Signs-Reviewed the patient's vital signs. Procedures:  Procedures    Provider Notes (Medical Decision Making): Suspect Covid. Also swab for flu. Treat his symptoms. His lung sounds are clear and we will not get an x-ray. Advised to f/up with PCP and to isolate until test results return. MED RECONCILIATION:  No current facility-administered medications for this encounter. Current Outpatient Medications   Medication Sig    ibuprofen (MOTRIN) 800 mg tablet Take 1 Tab by mouth every eight (8) hours for 5 days.  acetaminophen (Tylenol Extra Strength) 500 mg tablet Take 2 Tabs by mouth every six (6) hours as needed for Pain.  guaiFENesin ER (Mucinex) 600 mg ER tablet Take 1 Tab by mouth two (2) times a day.  traZODone (DESYREL) 100 mg tablet     gabapentin (NEURONTIN) 300 mg capsule TAKE ONE CAPSULE BY MOUTH THREE TIMES DAILY    clonazePAM (KLONOPIN) 1 mg tablet TAKE ONE-HALF TO ONE TABLET BY MOUTH TWICE DAILY AS NEEDED    sertraline (ZOLOFT) 100 mg tablet TAKE TWO TABLETS BY MOUTH ONCE DAILY (Patient taking differently: Take 150 mg by mouth daily.)    travoprost (TRAVATAN) 0.004 % ophthalmic solution Administer 1 Drop to both eyes every evening.     loratadine (CLARITIN) 10 mg tablet Take 10 mg by mouth as needed. Disposition:  home    DISCHARGE NOTE:   5:50 PM    Pt has been reexamined. Patient has no new complaints, changes, or physical findings. Care plan outlined and precautions discussed. Results of labs were reviewed with the patient. All medications were reviewed with the patient; will d/c home with see below. All of pt's questions and concerns were addressed. Patient was instructed and agrees to follow up with PCP, as well as to return to the ED upon further deterioration. Patient is ready to go home. Follow-up Information     Follow up With Specialties Details Why Contact Info    Aylin Epperson NP Nurse Practitioner Schedule an appointment as soon as possible for a visit in 2 days  [id number]  [address] [facility]      [address] [facility] EMERGENCY DEPT Emergency Medicine  If symptoms worsen return immediately [address] [facility]  [phone]          Current Discharge Medication List      START taking these medications    Details   ibuprofen (MOTRIN) 800 mg tablet Take 1 Tab by mouth every eight (8) hours for 5 days. Qty: 15 Tab, Refills: 0      acetaminophen (Tylenol Extra Strength) 500 mg tablet Take 2 Tabs by mouth every six (6) hours as needed for Pain. Qty: 20 Tab, Refills: 0      guaiFENesin ER (Mucinex) 600 mg ER tablet Take 1 Tab by mouth two (2) times a day. Qty: 20 Tab, Refills: 0             Diagnosis     Clinical Impression:   1.  Suspected COVID-19 virus infection

## 2021-01-25 NOTE — ED NOTES
Mauro Elam is a 45 y.o. male that was discharged in stable condition. The patients diagnosis, condition and treatment were explained to  patient and aftercare instructions were given. The patient verbalized understanding. Patient armband removed and shredded.

## 2021-01-26 ENCOUNTER — PATIENT OUTREACH (OUTPATIENT)
Dept: CASE MANAGEMENT | Age: 51
End: 2021-01-26

## 2021-01-26 NOTE — PROGRESS NOTES
Patient contacted regarding COVID-19. Discussed COVID-19 related testing which was pending at this time. Test results were pending. Patient informed of results, if available? n/a     Care Transition Nurse/ Ambulatory Care Manager contacted the patient by telephone to perform post discharge assessment. Verified name and  with patient as identifiers. Provided introduction to self, and explanation of the CTN/ACM role, and reason for call due to risk factors for infection and/or exposure to COVID-19. Symptoms reviewed with patient who verbalized the following symptoms: fever, pain or aching joints, chills or shaking and diarrhea. Due to no new or worsening symptoms encounter was not routed to provider for escalation. Advance Care Planning:   Does patient have an Advance Directive: currently not on file; education provided     Patient has following risk factors of: no known risk factors. CTN/ACM reviewed discharge instructions, medical action plan and red flags such as increased shortness of breath, increasing fever and signs of decompensation with patient who verbalized understanding. Discussed exposure protocols and quarantine with CDC Guidelines What to do if you are sick with coronavirus disease .  patient was given an opportunity for questions and concerns. The patient agrees to contact the [facility] exposure line [phone], local [facility] department [facility]  ([phone]) and PCP office for questions related to their healthcare. CTN/ACM provided contact information for future needs. Reviewed and educated patient on any new and changed medications related to discharge diagnosis. Patient/family/caregiver given information for [employer] and agrees to enroll no  Patient's preferred e-mail:    Pt's phone number:     Based on Loop alert triggers, patient will be contacted by nurse care manager for worsening symptoms.     Plan for follow-up call in 5-7 days based on severity of symptoms and risk factors.

## 2021-01-27 LAB — SARS-COV-2, COV2NT: DETECTED

## 2021-02-09 ENCOUNTER — PATIENT OUTREACH (OUTPATIENT)
Dept: CASE MANAGEMENT | Age: 51
End: 2021-02-09

## 2021-02-25 ENCOUNTER — VIRTUAL VISIT (OUTPATIENT)
Dept: FAMILY MEDICINE CLINIC | Age: 51
End: 2021-02-25
Payer: MEDICAID

## 2021-02-25 DIAGNOSIS — E78.1 HYPERTRIGLYCERIDEMIA: ICD-10-CM

## 2021-02-25 DIAGNOSIS — Z11.59 ENCOUNTER FOR HEPATITIS C SCREENING TEST FOR LOW RISK PATIENT: ICD-10-CM

## 2021-02-25 DIAGNOSIS — Z71.89 ADVANCE CARE PLANNING: ICD-10-CM

## 2021-02-25 DIAGNOSIS — R73.03 PREDIABETES: ICD-10-CM

## 2021-02-25 DIAGNOSIS — R45.4 IRRITABILITY AND ANGER: ICD-10-CM

## 2021-02-25 DIAGNOSIS — Z12.11 SCREEN FOR COLON CANCER: ICD-10-CM

## 2021-02-25 DIAGNOSIS — Z00.00 MEDICARE ANNUAL WELLNESS VISIT, SUBSEQUENT: Primary | ICD-10-CM

## 2021-02-25 DIAGNOSIS — F63.9 IMPULSE CONTROL DISORDER: ICD-10-CM

## 2021-02-25 PROCEDURE — 99497 ADVNCD CARE PLAN 30 MIN: CPT | Performed by: NURSE PRACTITIONER

## 2021-02-25 PROCEDURE — G0439 PPPS, SUBSEQ VISIT: HCPCS | Performed by: NURSE PRACTITIONER

## 2021-02-25 NOTE — PROGRESS NOTES
This is the Subsequent Medicare Annual Wellness Exam, performed 12 months or more after the Initial AWV or the last Subsequent AWV    I have reviewed the patient's medical history in detail and updated the computerized patient record. Depression Risk Factor Screening:     3 most recent PHQ Screens 2/25/2021   PHQ Not Done -   Little interest or pleasure in doing things Not at all   Feeling down, depressed, irritable, or hopeless Not at all   Total Score PHQ 2 0       Alcohol Risk Screen    Do you average more than 2 drinks per night or 14 drinks a week: No    On any one occasion in the past three months have you have had more than 4 drinks containing alcohol:  No        Functional Ability and Level of Safety:    Hearing: Hearing is good. Activities of Daily Living: The home contains: grab bars  Patient does total self care      Ambulation: with mild difficulty     Fall Risk:  No flowsheet data found. Abuse Screen:  Patient is not abused       Cognitive Screening    Has your family/caregiver stated any concerns about your memory: no       Assessment/Plan   Education and counseling provided:  Are appropriate based on today's review and evaluation  End-of-Life planning (with patient's consent)  Pneumococcal Vaccine  Influenza Vaccine  Colorectal cancer screening tests  Cardiovascular screening blood test  Diabetes screening test  COVID vaccine    Diagnoses and all orders for this visit:    1. Medicare annual wellness visit, subsequent    2. Advance care planning    3. Prediabetes  -     METABOLIC PANEL, COMPREHENSIVE; Future  -     LIPID PANEL; Future  -     CBC WITH AUTOMATED DIFF; Future  -     TSH 3RD GENERATION; Future  -     T4, FREE; Future  -     HEMOGLOBIN A1C WITH EAG; Future  -     MICROALBUMIN, UR, RAND W/ MICROALB/CREAT RATIO; Future    4. Hypertriglyceridemia  -     METABOLIC PANEL, COMPREHENSIVE; Future  -     LIPID PANEL; Future  -     CBC WITH AUTOMATED DIFF;  Future  -     TSH 3RD GENERATION; Future  -     T4, FREE; Future    5. Impulse control disorder  -     METABOLIC PANEL, COMPREHENSIVE; Future  -     LIPID PANEL; Future  -     CBC WITH AUTOMATED DIFF; Future  -     TSH 3RD GENERATION; Future  -     T4, FREE; Future    6. Irritability and anger  -     METABOLIC PANEL, COMPREHENSIVE; Future  -     LIPID PANEL; Future  -     CBC WITH AUTOMATED DIFF; Future  -     TSH 3RD GENERATION; Future  -     T4, FREE; Future    7. Encounter for hepatitis C screening test for low risk patient  -     HEPATITIS C AB; Future    8. Screen for colon cancer  -     REFERRAL FOR COLONOSCOPY    Patient to go for fasting labs. Health Maintenance Due     Health Maintenance Due   Topic Date Due    Hepatitis C Screening  1970    DTaP/Tdap/Td series (1 - Tdap) 01/10/1990    Medicare Yearly Exam  10/12/2018    Shingrix Vaccine Age 50> (1 of 2) 01/10/2019    Colorectal Cancer Screening Combo  01/10/2019    A1C test (Diabetic or Prediabetic)  10/23/2019       Patient Care Team   Patient Care Team:  Nevaeh Donald NP as PCP - General (Nurse Practitioner)  Nevaeh Donald NP as PCP - [facility] Empaneled Provider    History     Patient Active Problem List   Diagnosis Code    Impulse control disorder F63.9    Depression F32.9    Anxiety F41.9    Insomnia G47.00    Irritability and anger R45.4    AR (allergic rhinitis) J30.9    Glaucoma, open angle, Dr. Jeff Acuña H40.10X0    Cataract H26.9    Atypical chest pain R07.89    Prediabetes R73.03    Hypertriglyceridemia E78.1    ACP (advance care planning) Z71.89     Past Medical History:   Diagnosis Date    Anxiety     Depression     Elevated BP     Glaucoma     Hypokalemia     Hyponatraemia     Impulse control disorder     Other specific developmental learning difficulties     Seizure [facility])     age 6/ mid age 10-17.  Speech impediment       History reviewed. No pertinent surgical history.   Current Outpatient Medications   Medication Sig Dispense Refill    Lumigan 0.01 % ophthalmic drops       acetaminophen (Tylenol Extra Strength) 500 mg tablet Take 2 Tabs by mouth every six (6) hours as needed for Pain. 20 Tab 0    traZODone (DESYREL) 100 mg tablet       gabapentin (NEURONTIN) 300 mg capsule TAKE ONE CAPSULE BY MOUTH THREE TIMES DAILY 90 Cap 0    clonazePAM (KLONOPIN) 1 mg tablet TAKE ONE-HALF TO ONE TABLET BY MOUTH TWICE DAILY AS NEEDED 60 Tab 2    sertraline (ZOLOFT) 100 mg tablet TAKE TWO TABLETS BY MOUTH ONCE DAILY (Patient taking differently: Take 150 mg by mouth daily.) 60 Tab 3    loratadine (CLARITIN) 10 mg tablet Take 10 mg by mouth as needed.  travoprost (TRAVATAN) 0.004 % ophthalmic solution Administer 1 Drop to both eyes every evening. No Known Allergies    History reviewed. No pertinent family history. Social History     Tobacco Use    Smoking status: Never Smoker    Smokeless tobacco: Never Used   Substance Use Topics    Alcohol use:  No

## 2021-02-25 NOTE — PROGRESS NOTES
Advance Care Planning     Advance Care Planning (ACP) Physician/NP/PA Conversation      Date of Conversation: 2021  Conducted with: Patient with Decision Making Capacity    Healthcare Decision Maker:     Click here to complete [place] including selection of the [place] Relationship (ie \"Primary\")  Today we documented Decision Maker(s) consistent with Legal Next of Kin hierarchy. Parents are  and brother Lia Corley is next of kin. Patient has never been  and he does not have any children. Care Preferences:    Hospitalization: \"If your health worsens and it becomes clear that your chance of recovery is unlikely, what would be your preference regarding hospitalization? \"  The patient would prefer hospitalization. and He will think about this more and discuss this more with his brother. Ventilation: \"If you were unable to breathe on your own and your chance of recovery was unlikely, what would be your preference about the use of a ventilator (breathing machine) if it was available to you? \"   The patient is unsure. Resuscitation: \"In the event your heart stopped as a result of an underlying serious health condition, would you want attempts to be made to restart your heart, or would you prefer a natural death? \"   Yes, attempt to resuscitate. Additional topics discussed: treatment goals, hospitalization preferences, resuscitation preferences, hospice care and organ donation    Conversation Outcomes / Follow-Up Plan:   ACP in process - information provided, considering goals and options  Reviewed DNR/DNI with patient and brother.     Length of Voluntary ACP Conversation in minutes:  16 minutes    Carmen Amaral NP

## 2021-02-25 NOTE — PROGRESS NOTES
Marlene Rodriguez presents today for   Chief Complaint   Patient presents with   [place] Annual Wellness Visit     Medicare       Marlene Rodriguez preferred language for health care discussion is english/other. Is someone accompanying this pt? Yes, brother, Rody Torres    Is the patient using any DME equipment during [address]? no    Depression Screening:  3 most recent PHQ Screens 2/25/2021   PHQ Not Done -   Little interest or pleasure in doing things Not at all   Feeling down, depressed, irritable, or hopeless Not at all   Total Score PHQ 2 0       Learning Assessment:  Learning Assessment 1/27/2021   PRIMARY LEARNER Patient   HIGHEST LEVEL OF EDUCATION - PRIMARY LEARNER  GRADUATED HIGH SCHOOL OR GED   BARRIERS PRIMARY LEARNER [address] CAREGIVER Yes   CO-LEARNER NAME [id number] [facility] HIGHEST LEVEL OF EDUCATION > 4 YEARS OF COLLEGE   BARRIERS CO-LEARNER NONE   PRIMARY LANGUAGE ENGLISH   PRIMARY LANGUAGE CO-LEARNER ENGLISH    NEED No   LEARNER PREFERENCE PRIMARY DEMONSTRATION   LEARNER PREFERENCE CO-LEARNER DEMONSTRATION   ANSWERED BY patient   RELATIONSHIP SELF       Abuse Screening:  Abuse Screening Questionnaire 1/27/2021   Do you ever feel afraid of your partner? N   Are you in a relationship with someone who physically or mentally threatens you? N   Is it safe for you to go home? Y       Generalized Anxiety  No flowsheet data found. Health Maintenance Due   Topic Date Due    Hepatitis C Screening  1970    DTaP/Tdap/Td series (1 - Tdap) 01/10/1990    Medicare Yearly Exam  10/12/2018    Shingrix Vaccine Age 50> (1 of 2) 01/10/2019    Colorectal Cancer Screening Combo  01/10/2019    A1C test (Diabetic or Prediabetic)  10/23/2019    Flu Vaccine (1) 09/01/2020   . Health Maintenance reviewed and discussed and ordered per Provider. Coordination of Care:  1. Have you been to the ER, urgent care clinic since your last visit?   Hospitalized since your last visit? no    2. Have you seen or consulted any other health care providers outside of the [address] [place] [address] since your last visit? Include any pap smears or colon screening.  Yes, eye dr      Advance Directive:  Discussed 1/27/21

## 2021-02-25 NOTE — PATIENT INSTRUCTIONS
Medicare Wellness Visit, Male The best way to live healthy is to have a lifestyle where you eat a well-balanced diet, exercise regularly, limit alcohol use, and quit all forms of tobacco/nicotine, if applicable. Regular preventive services are another way to keep healthy. Preventive services (vaccines, screening tests, monitoring & exams) can help personalize your care plan, which helps you manage your own care. Screening tests can find health problems at the earliest stages, when they are easiest to treat. [facility] follows the current, evidence-based guidelines published by the [facility] Duane Johan ([facility]) when recommending preventive services for our patients. Because we follow these guidelines, sometimes recommendations change over time as research supports it. (For example, a prostate screening blood test is no longer routinely recommended for men with no symptoms). Of course, you and your doctor may decide to screen more often for some diseases, based on your risk and co-morbidities (chronic disease you are already diagnosed with). Preventive services for you include: - Medicare offers their members a free annual wellness visit, which is time for you and your primary care provider to discuss and plan for your preventive service needs. Take advantage of this benefit every year! 
-All adults over age 72 should receive the recommended pneumonia vaccines. Current USPSTF guidelines recommend a series of two vaccines for the best pneumonia protection.  
-All adults should have a flu vaccine yearly and tetanus vaccine every 10 years. 
-All adults age 48 and older should receive the shingles vaccines (series of two vaccines). -All adults age 38-68 who are overweight should have a diabetes screening test once every three years. -Other screening tests & preventive services for persons with diabetes include: an eye exam to screen for diabetic retinopathy, a kidney function test, a foot exam, and stricter control over your cholesterol.  
-Cardiovascular screening for adults with routine risk involves an electrocardiogram (ECG) at intervals determined by the provider.  
-Colorectal cancer screening should be done for adults age 54-65 with no increased risk factors for colorectal cancer. There are a number of acceptable methods of screening for this type of cancer. Each test has its own benefits and drawbacks. Discuss with your provider what is most appropriate for you during your annual wellness visit. The different tests include: colonoscopy (considered the best screening method), a fecal occult blood test, a fecal DNA test, and sigmoidoscopy. 
-All adults born between [facility] should be screened once for Hepatitis C. 
-An Abdominal Aortic Aneurysm (AAA) Screening is recommended for men age 73-68 who has ever smoked in their lifetime. Here is a list of your current Health Maintenance items (your personalized list of preventive services) with a due date: 
Health Maintenance Due Topic Date Due  
 Hepatitis C Test  1970  
 DTaP/Tdap/Td  (1 - Tdap) 01/10/1990 Jayesh Vaz Annual Well Visit  10/12/2018  Shingles Vaccine (1 of 2) 01/10/2019  Colorectal Screening  01/10/2019  Hemoglobin A1C    10/23/2019  Yearly Flu Vaccine (1) 09/01/2020

## 2021-02-26 DIAGNOSIS — F63.9 IMPULSE CONTROL DISORDER: ICD-10-CM

## 2021-02-26 DIAGNOSIS — E78.1 HYPERTRIGLYCERIDEMIA: ICD-10-CM

## 2021-02-26 DIAGNOSIS — R45.4 IRRITABILITY AND ANGER: ICD-10-CM

## 2021-02-26 DIAGNOSIS — Z11.59 ENCOUNTER FOR HEPATITIS C SCREENING TEST FOR LOW RISK PATIENT: ICD-10-CM

## 2021-02-26 DIAGNOSIS — R73.03 PREDIABETES: ICD-10-CM

## 2021-03-01 ENCOUNTER — HOSPITAL ENCOUNTER (OUTPATIENT)
Dept: LAB | Age: 51
Discharge: HOME | End: 2021-03-01

## 2021-03-01 LAB — XX-LABCORP SPECIMEN COL,LCBCF: NORMAL

## 2021-03-01 PROCEDURE — 99001 SPECIMEN HANDLING PT-LAB: CPT

## 2021-03-03 LAB
ALBUMIN SERPL-MCNC: 4.4 G/DL (ref 3.8–4.9)
ALBUMIN/CREAT UR: 3 MG/G CREAT (ref 0–29)
ALBUMIN/GLOB SERPL: 1.8 {RATIO} (ref 1.2–2.2)
ALP SERPL-CCNC: 71 IU/L (ref 39–117)
ALT SERPL-CCNC: 43 IU/L (ref 0–44)
AST SERPL-CCNC: 36 IU/L (ref 0–40)
BASOPHILS # BLD AUTO: 0 X10E3/UL (ref 0–0.2)
BASOPHILS NFR BLD AUTO: 1 %
BILIRUB SERPL-MCNC: 0.4 MG/DL (ref 0–1.2)
BUN SERPL-MCNC: 9 MG/DL (ref 6–24)
BUN/CREAT SERPL: 10 (ref 9–20)
CALCIUM SERPL-MCNC: 9 MG/DL (ref 8.7–10.2)
CHLORIDE SERPL-SCNC: 102 MMOL/L (ref 96–106)
CHOLEST SERPL-MCNC: 172 MG/DL (ref 100–199)
CO2 SERPL-SCNC: 20 MMOL/L (ref 20–29)
CREAT SERPL-MCNC: 0.93 MG/DL (ref 0.76–1.27)
CREAT UR-MCNC: 177.8 MG/DL
EOSINOPHIL # BLD AUTO: 0.1 X10E3/UL (ref 0–0.4)
EOSINOPHIL NFR BLD AUTO: 2 %
ERYTHROCYTE [DISTWIDTH] IN BLOOD BY AUTOMATED COUNT: 14.9 % (ref 11.6–15.4)
EST. AVERAGE GLUCOSE BLD GHB EST-MCNC: 126 MG/DL
GLOBULIN SER CALC-MCNC: 2.4 G/DL (ref 1.5–4.5)
GLUCOSE SERPL-MCNC: 113 MG/DL (ref 65–99)
HBA1C MFR BLD: 6 % (ref 4.8–5.6)
HCT VFR BLD AUTO: 48 % (ref 37.5–51)
HCV AB S/CO SERPL IA: <0.1 S/CO RATIO (ref 0–0.9)
HDLC SERPL-MCNC: 33 MG/DL
HGB BLD-MCNC: 16.1 G/DL (ref 13–17.7)
IMM GRANULOCYTES # BLD AUTO: 0 X10E3/UL (ref 0–0.1)
IMM GRANULOCYTES NFR BLD AUTO: 1 %
IMP & REVIEW OF LAB RESULTS: NORMAL
LDLC SERPL CALC-MCNC: 106 MG/DL (ref 0–99)
LYMPHOCYTES # BLD AUTO: 1.8 X10E3/UL (ref 0.7–3.1)
LYMPHOCYTES NFR BLD AUTO: 24 %
MCH RBC QN AUTO: 28.9 PG (ref 26.6–33)
MCHC RBC AUTO-ENTMCNC: 33.5 G/DL (ref 31.5–35.7)
MCV RBC AUTO: 86 FL (ref 79–97)
MICROALBUMIN UR-MCNC: 5.1 UG/ML
MONOCYTES # BLD AUTO: 0.5 X10E3/UL (ref 0.1–0.9)
MONOCYTES NFR BLD AUTO: 7 %
NEUTROPHILS # BLD AUTO: 4.9 X10E3/UL (ref 1.4–7)
NEUTROPHILS NFR BLD AUTO: 65 %
PLATELET # BLD AUTO: 164 X10E3/UL (ref 150–450)
POTASSIUM SERPL-SCNC: 4.3 MMOL/L (ref 3.5–5.2)
PROT SERPL-MCNC: 6.8 G/DL (ref 6–8.5)
RBC # BLD AUTO: 5.58 X10E6/UL (ref 4.14–5.8)
SODIUM SERPL-SCNC: 142 MMOL/L (ref 134–144)
SPECIMEN STATUS REPORT, ROLRST: NORMAL
TRIGL SERPL-MCNC: 190 MG/DL (ref 0–149)
TSH SERPL DL<=0.005 MIU/L-ACNC: 3.34 UIU/ML (ref 0.45–4.5)
VLDLC SERPL CALC-MCNC: 33 MG/DL (ref 5–40)
WBC # BLD AUTO: 7.4 X10E3/UL (ref 3.4–10.8)

## 2021-04-08 ENCOUNTER — TELEPHONE (OUTPATIENT)
Dept: FAMILY MEDICINE CLINIC | Age: 51
End: 2021-04-08

## 2021-04-08 NOTE — TELEPHONE ENCOUNTER
Patient brother called and wanted to know when does he need to be seen again we do not have a follow up for him.  Please advise

## 2021-05-26 DIAGNOSIS — E78.1 HYPERTRIGLYCERIDEMIA: ICD-10-CM

## 2021-05-26 DIAGNOSIS — R45.4 IRRITABILITY AND ANGER: ICD-10-CM

## 2021-05-26 DIAGNOSIS — R73.03 PREDIABETES: ICD-10-CM

## 2021-05-26 DIAGNOSIS — F63.9 IMPULSE CONTROL DISORDER: ICD-10-CM

## 2023-07-12 NOTE — PROGRESS NOTES
Patient resolved from Transition of Care episode on 2/9/21. Patient/family has been provided the following resources and education related to COVID-19:                         Signs, symptoms and red flags related to COVID-19            CDC exposure and quarantine guidelines            [facility] exposure contact - [phone]            Contact for their local Department of Health                 Patient currently reports that their sx have improved or resolved. No further outreach scheduled with this CTN/ACM. Episode of Care resolved. Patient has this CTN/ACM contact information if future needs arise. Continue Regimen: Zinc supplement Detail Level: Zone Initiate Treatment: Clobetasol scalp solution as needed Render In Strict Bullet Format?: No Samples Given: Vanicream zinc shampoo